# Patient Record
Sex: MALE | Race: WHITE
[De-identification: names, ages, dates, MRNs, and addresses within clinical notes are randomized per-mention and may not be internally consistent; named-entity substitution may affect disease eponyms.]

---

## 2017-12-14 ENCOUNTER — HOSPITAL ENCOUNTER (INPATIENT)
Dept: HOSPITAL 41 - JD.ED | Age: 47
LOS: 2 days | Discharge: HOME | DRG: 812 | End: 2017-12-16
Attending: INTERNAL MEDICINE | Admitting: INTERNAL MEDICINE
Payer: MEDICARE

## 2017-12-14 DIAGNOSIS — Z99.3: ICD-10-CM

## 2017-12-14 DIAGNOSIS — R19.5: ICD-10-CM

## 2017-12-14 DIAGNOSIS — K21.9: ICD-10-CM

## 2017-12-14 DIAGNOSIS — R06.6: ICD-10-CM

## 2017-12-14 DIAGNOSIS — G89.29: ICD-10-CM

## 2017-12-14 DIAGNOSIS — M16.11: ICD-10-CM

## 2017-12-14 DIAGNOSIS — Z88.8: ICD-10-CM

## 2017-12-14 DIAGNOSIS — M21.952: ICD-10-CM

## 2017-12-14 DIAGNOSIS — M21.951: ICD-10-CM

## 2017-12-14 DIAGNOSIS — M25.551: ICD-10-CM

## 2017-12-14 DIAGNOSIS — K22.70: ICD-10-CM

## 2017-12-14 DIAGNOSIS — D50.9: ICD-10-CM

## 2017-12-14 DIAGNOSIS — K44.9: ICD-10-CM

## 2017-12-14 DIAGNOSIS — R03.0: ICD-10-CM

## 2017-12-14 DIAGNOSIS — M25.552: ICD-10-CM

## 2017-12-14 DIAGNOSIS — K20.8: ICD-10-CM

## 2017-12-14 DIAGNOSIS — D64.89: Primary | ICD-10-CM

## 2017-12-14 DIAGNOSIS — G80.9: ICD-10-CM

## 2017-12-14 DIAGNOSIS — K92.2: ICD-10-CM

## 2017-12-14 PROCEDURE — P9016 RBC LEUKOCYTES REDUCED: HCPCS

## 2017-12-14 PROCEDURE — 85025 COMPLETE CBC W/AUTO DIFF WBC: CPT

## 2017-12-14 PROCEDURE — 86850 RBC ANTIBODY SCREEN: CPT

## 2017-12-14 PROCEDURE — 73502 X-RAY EXAM HIP UNI 2-3 VIEWS: CPT

## 2017-12-14 PROCEDURE — 36430 TRANSFUSION BLD/BLD COMPNT: CPT

## 2017-12-14 PROCEDURE — 86922 COMPATIBILITY TEST ANTIGLOB: CPT

## 2017-12-14 PROCEDURE — 80048 BASIC METABOLIC PNL TOTAL CA: CPT

## 2017-12-14 PROCEDURE — 85610 PROTHROMBIN TIME: CPT

## 2017-12-14 PROCEDURE — 99285 EMERGENCY DEPT VISIT HI MDM: CPT

## 2017-12-14 PROCEDURE — 84443 ASSAY THYROID STIM HORMONE: CPT

## 2017-12-14 PROCEDURE — 30233N1 TRANSFUSION OF NONAUTOLOGOUS RED BLOOD CELLS INTO PERIPHERAL VEIN, PERCUTANEOUS APPROACH: ICD-10-PCS | Performed by: INTERNAL MEDICINE

## 2017-12-14 PROCEDURE — 86901 BLOOD TYPING SEROLOGIC RH(D): CPT

## 2017-12-14 PROCEDURE — 80053 COMPREHEN METABOLIC PANEL: CPT

## 2017-12-14 PROCEDURE — 83036 HEMOGLOBIN GLYCOSYLATED A1C: CPT

## 2017-12-14 PROCEDURE — 36415 COLL VENOUS BLD VENIPUNCTURE: CPT

## 2017-12-14 PROCEDURE — 86900 BLOOD TYPING SEROLOGIC ABO: CPT

## 2017-12-14 PROCEDURE — 80061 LIPID PANEL: CPT

## 2017-12-14 NOTE — EDM.PDOC
ED HPI GENERAL MEDICAL PROBLEM





- General


Chief Complaint: Gastrointestinal Problem


Stated Complaint: BLOOD TRANSFUSION


Time Seen by Provider: 12/14/17 18:58


Source of Information: Reports: Patient, Family, Provider


History Limitations: Reports: No Limitations





- History of Present Illness


INITIAL COMMENTS - FREE TEXT/NARRATIVE: 





The patient presents with anemia.  He went to the clinic today to see Lester Garcia for hiccups.  This has been going on for 2 years.  He had never tried to 

get help for this before.  Lester did some lab work and found his Hgb was 5.5.  

The patient does not have any black or tarry stools.  He has no headache, fever

, chills, cough, chest pain, shortness of breath, abdominal pain, nausea or 

vomiting.  He has a history of cerebral palsy and he is in a wheel chair.  He 

does not stand or walk so he is not symptomatic that way with being light 

headed.  He has never had low Hgb before.  He is also having right hip pain.  

They did x-rays on his hip also.


Onset: Gradual


Duration: Day(s):


Severity: Moderate


Improves with: Reports: None


Worsens with: Reports: None


Associated Symptoms: Reports: No Other Symptoms





- Related Data


 Allergies











Allergy/AdvReac Type Severity Reaction Status Date / Time


 


aspirin Allergy  Hives Verified 12/14/17 18:09











Home Meds: 


 Home Meds





. [No Known Home Meds]  12/14/17 [History]











Past Medical History


Gastrointestinal History: Reports: GERD


Neurological History: Reports: Cerebral Palsy


Hematologic History: Reports: Anemia





- Past Surgical History


GI Surgical History: Reports: Appendectomy


Musculoskeletal Surgical History: Reports: Other (See Below)


Other Musculoskeletal Surgeries/Procedures:: Hip surgery





Social & Family History





- Family History


Family Medical History: Noncontributory





- Tobacco Use


Smoking Status *Q: Never Smoker





- Recreational Drug Use


Recreational Drug Use: No





ED ROS GENERAL





- Review of Systems


Review Of Systems: See Below


Constitutional: Reports: No Symptoms


HEENT: Reports: No Symptoms


Respiratory: Reports: No Symptoms


Cardiovascular: Reports: No Symptoms


Endocrine: Reports: No Symptoms


GI/Abdominal: Reports: Other (Hiccups).  Denies: Abdominal Pain, Black Stool, 

Bloody Stool, Hematochezia, Nausea, Vomiting


: Reports: No Symptoms


Musculoskeletal: Reports: Other (Right hip pain)





ED EXAM, GI/ABD





- Physical Exam


Exam: See Below


Exam Limited By: No Limitations


General Appearance: Alert, No Apparent Distress


Ears: Normal External Exam


Nose: Normal Inspection


Head: Atraumatic, Normocephalic


Neck: Normal Inspection


Respiratory/Chest: No Respiratory Distress, Lungs Clear, Normal Breath Sounds


Cardiovascular: Regular Rate, Rhythm, No Edema, No Murmur


GI/Abdominal Exam: Soft, Non-Tender, No Organomegaly, No Mass


Rectal (Males) Exam: Heme + Stool


Back Exam: Normal Inspection


Extremities: Other (Right hip pain)





Course





- Vital Signs


Last Recorded V/S: 





 Last Vital Signs











Temp  99.3 F   12/14/17 21:03


 


Pulse  110 H  12/14/17 18:09


 


Resp  16   12/14/17 21:03


 


BP  137/81   12/14/17 21:03


 


Pulse Ox  98   12/14/17 21:03














- Orders/Labs/Meds


Orders: 





 Active Orders 24 hr











 Category Date Time Status


 


 Peripheral IV Care [RC] .AS DIRECTED Care  12/14/17 19:08 Active


 


 PATIENT RETYPE [BBK] Stat Lab  12/14/17 18:42 Results


 


 RED BLOOD CELLS LP [BBK] Stat Lab  12/14/17 18:42 Results


 


 TYPE AND SCREEN [BBK] Stat Lab  12/14/17 18:42 Results


 


 Sodium Chloride 0.9% [Normal Saline] 1,000 ml Med  12/14/17 23:00 Active





 IV ONETIME   


 


 Sodium Chloride 0.9% [Saline Flush] Med  12/14/17 19:08 Active





 10 ml FLUSH ASDIRECTED PRN   


 


 Peripheral IV Insertion Adult [OM.PC] Routine Oth  12/14/17 19:08 Ordered


 


 Transfuse PRBC [Transfuse Red Blood Cells] [COMM] Stat Oth  12/14/17 19:08 

Ordered








 Medication Orders





Sodium Chloride (Normal Saline)  1,000 mls @ 25 mls/hr IV ONETIME ONE


   Stop: 12/16/17 14:59


Sodium Chloride (Saline Flush)  10 ml FLUSH ASDIRECTED PRN


   PRN Reason: Keep Vein Open


   Last Admin: 12/14/17 20:44  Dose: 10 ml








Labs: 





 Laboratory Tests











  12/14/17 12/14/17 12/14/17 Range/Units





  18:42 18:42 18:42 


 


WBC  8.64    (4.23-9.07)  K/mm3


 


RBC  3.98 L    (4.63-6.08)  M/mm3


 


Hgb  5.4 L*    (13.7-17.5)  gm/L


 


Hct  22.3 L    (40.1-51.0)  %


 


MCV  56.0 L    (79.0-92.2)  fl


 


MCH  13.6 L    (25.7-32.2)  pg


 


MCHC  24.2 L    (32.2-35.5)  g/dl


 


RDW Std Deviation  42.2    (35.1-43.9)  fL


 


Plt Count  735 H    (163-337)  K/mm3


 


MPV  7.8 L    (9.4-12.3)  fl


 


Neut % (Auto)  72.3 H    (34.0-67.9)  %


 


Lymph % (Auto)  18.8 L    (21.8-53.1)  %


 


Mono % (Auto)  5.9    (5.3-12.2)  %


 


Eos % (Auto)  2.3    (0.8-7.0)  


 


Baso % (Auto)  0.6    (0.1-1.2)  %


 


Neut # (Auto)  6.25 H    (1.78-5.38)  K/mm3


 


Lymph # (Auto)  1.62    (1.32-3.57)  K/mm3


 


Mono # (Auto)  0.51    (0.30-0.82)  K/mm3


 


Eos # (Auto)  0.20    (0.04-0.54)  K/mm3


 


Baso # (Auto)  0.05    (0.01-0.08)  K/mm3


 


Manual Slide Review  Abnormal smear    


 


PT   10.8   (8.0-13.0)  SECONDS


 


INR   0.99   


 


Sodium    142  (136-145)  mEq/L


 


Potassium    4.4  (3.5-5.1)  mEq/L


 


Chloride    106  ()  mEq/L


 


Carbon Dioxide    28  (21-32)  mEq/L


 


Anion Gap    12.4  (5-15)  


 


BUN    17  (7-18)  mg/dL


 


Creatinine    0.8  (0.7-1.3)  mg/dL


 


Est Cr Clr Drug Dosing    96.74  mL/min


 


Estimated GFR (MDRD)    > 60  (>60)  mL/min


 


BUN/Creatinine Ratio    21.3 H  (14-18)  


 


Glucose    105  ()  mg/dL


 


Calcium    9.1  (8.5-10.1)  mg/dL


 


Total Bilirubin    0.5  (0.2-1.0)  mg/dL


 


AST    8 L  (15-37)  U/L


 


ALT    15 L  (16-63)  U/L


 


Alkaline Phosphatase    73  ()  U/L


 


Total Protein    7.5  (6.4-8.2)  g/dl


 


Albumin    3.8  (3.4-5.0)  g/dl


 


Globulin    3.7  gm/dL


 


Albumin/Globulin Ratio    1.0  (1-2)  


 


Blood Type     


 


Gel Antibody Screen     


 


Crossmatch     














  12/14/17 Range/Units





  18:42 


 


WBC   (4.23-9.07)  K/mm3


 


RBC   (4.63-6.08)  M/mm3


 


Hgb   (13.7-17.5)  gm/L


 


Hct   (40.1-51.0)  %


 


MCV   (79.0-92.2)  fl


 


MCH   (25.7-32.2)  pg


 


MCHC   (32.2-35.5)  g/dl


 


RDW Std Deviation   (35.1-43.9)  fL


 


Plt Count   (163-337)  K/mm3


 


MPV   (9.4-12.3)  fl


 


Neut % (Auto)   (34.0-67.9)  %


 


Lymph % (Auto)   (21.8-53.1)  %


 


Mono % (Auto)   (5.3-12.2)  %


 


Eos % (Auto)   (0.8-7.0)  


 


Baso % (Auto)   (0.1-1.2)  %


 


Neut # (Auto)   (1.78-5.38)  K/mm3


 


Lymph # (Auto)   (1.32-3.57)  K/mm3


 


Mono # (Auto)   (0.30-0.82)  K/mm3


 


Eos # (Auto)   (0.04-0.54)  K/mm3


 


Baso # (Auto)   (0.01-0.08)  K/mm3


 


Manual Slide Review   


 


PT   (8.0-13.0)  SECONDS


 


INR   


 


Sodium   (136-145)  mEq/L


 


Potassium   (3.5-5.1)  mEq/L


 


Chloride   ()  mEq/L


 


Carbon Dioxide   (21-32)  mEq/L


 


Anion Gap   (5-15)  


 


BUN   (7-18)  mg/dL


 


Creatinine   (0.7-1.3)  mg/dL


 


Est Cr Clr Drug Dosing   mL/min


 


Estimated GFR (MDRD)   (>60)  mL/min


 


BUN/Creatinine Ratio   (14-18)  


 


Glucose   ()  mg/dL


 


Calcium   (8.5-10.1)  mg/dL


 


Total Bilirubin   (0.2-1.0)  mg/dL


 


AST   (15-37)  U/L


 


ALT   (16-63)  U/L


 


Alkaline Phosphatase   ()  U/L


 


Total Protein   (6.4-8.2)  g/dl


 


Albumin   (3.4-5.0)  g/dl


 


Globulin   gm/dL


 


Albumin/Globulin Ratio   (1-2)  


 


Blood Type  A NEGATIVE  


 


Gel Antibody Screen  Negative  


 


Crossmatch  See Detail  











Meds: 





Medications











Generic Name Dose Route Start Last Admin





  Trade Name Freq  PRN Reason Stop Dose Admin


 


Sodium Chloride  1,000 mls @ 25 mls/hr  12/14/17 23:00  





  Normal Saline  IV  12/16/17 14:59  





  ONETIME ONE   


 


Sodium Chloride  10 ml  12/14/17 19:08  12/14/17 20:44





  Saline Flush  FLUSH   10 ml





  ASDIRECTED PRN   Administration





  Keep Vein Open   














Discontinued Medications














Generic Name Dose Route Start Last Admin





  Trade Name Freq  PRN Reason Stop Dose Admin


 


Sodium Chloride  Confirm  12/14/17 20:43  12/14/17 21:02





  Normal Saline  Administered  12/14/17 20:44  Not Given





  Dose   





  1,000 mls @ as directed   





  .ROUTE   





  .STK-MED ONE   














- Re-Assessments/Exams


Free Text/Narrative Re-Assessment/Exam: 





12/14/17 21:14


I ordered an IV saline lock and labs.


12/14/17 21:15


His Hgb was 5.4.  The rest of his labs look good.  His stool guiac was 

positive.  I have ordered 2 units.  I feel he needs to be admitted.  I called 

Dr Schneider and she agreed to the admission.





Departure





- Departure


Time of Disposition: 21:20


Disposition: Admitted As Inpatient 64


Clinical Impression: 


 Right hip pain, Hiccups





Anemia


Qualifiers:


 Anemia type: other cause Other causes of anemia: other cause, not classified 

Qualified Code(s): D64.89 - Other specified anemias





GI bleed


Qualifiers:


 GI bleed type/associated pathology: unspecified gastrointestinal hemorrhage 

type Qualified Code(s): K92.2 - Gastrointestinal hemorrhage, unspecified





Cerebral palsy


Qualifiers:


 Cerebral palsy type: unspecified type Qualified Code(s): G80.9 - Cerebral palsy

, unspecified








- Discharge Information


Referrals: 


Lester Garcia PA-C [Primary Care Provider] - 





- My Orders


Last 24 Hours: 





My Active Orders





12/14/17 18:42


RED BLOOD CELLS LP [BBK] Stat 





12/14/17 19:08


Peripheral IV Care [RC] .AS DIRECTED 


Sodium Chloride 0.9% [Saline Flush]   10 ml FLUSH ASDIRECTED PRN 


Peripheral IV Insertion Adult [OM.PC] Routine 


Transfuse PRBC [Transfuse Red Blood Cells] [COMM] Stat 





12/14/17 23:00


Sodium Chloride 0.9% [Normal Saline] 1,000 ml IV ONETIME 














- Assessment/Plan


Last 24 Hours: 





My Active Orders





12/14/17 18:42


RED BLOOD CELLS LP [BBK] Stat 





12/14/17 19:08


Peripheral IV Care [RC] .AS DIRECTED 


Sodium Chloride 0.9% [Saline Flush]   10 ml FLUSH ASDIRECTED PRN 


Peripheral IV Insertion Adult [OM.PC] Routine 


Transfuse PRBC [Transfuse Red Blood Cells] [COMM] Stat 





12/14/17 23:00


Sodium Chloride 0.9% [Normal Saline] 1,000 ml IV ONETIME

## 2017-12-14 NOTE — PCM.HP
H&P History of Present Illness





- General


Date of Service: 12/14/17


Admit Problem/Dx: 


Anemia 





Source of Information: Patient, Old Records, Provider, RN, RN Notes Reviewed


History Limitations: Reports: No Limitations





- History of Present Illness


Initial Comments - Free Text/Narative: 


Tab Carpenter is a 46 yo male who presented to ED today after being seen in 

clinic by Lester Garcia PA-C hiccups, which have been ongoing for the past 2 

years. This was his first time seeing anyone for the hiccups. Lester did some lab 

work and his hemoglobin was found to be 5.5. e denies any black, tarry or 

stools containing blood.  Denies headaches, fever, chills, cough, chest pain, 

shortness of breath, abdominal pain, nausea  History of cerebral palsy and is 

wheelchair bound.  He does not stand or walk.  Because of this he does not get 

lightheaded.  He has never had low hemoglobin before to the best of his 

knowledge. He is also having right hip pain.





In the ED temperature was 99.3.  Pulse was 110. /81.  pulse ox 90% on 

room air. labs were obtained: WBCs are normal at 8.64.  RBCs are low at 3.9.  

Hemoglobin  He is very microcytic.  platelets are elevated at 735,000. 

Neutrophils are elevated at 72.3%. PT is 10.8.  INR 0.99.  Sodium good at 142.  

Potassium 4.4.  Chloride 106. carbon dioxide 28.  Anion gap 12.4.  BUN 17.  

Creatinine 0.8.  eGFR is greater than 60. glucose is 105.  Calcium 9.1.  Total 

bilirubin 0.5. AST is low at 8.  ALT low at 15.  Alkaline phosphatase is 73.  

Protein is good at 7.5. Albumin is 3.8.  Type and screen was performed with.  

General antibody screen was negati  Stool guaiac was obtained and was positive.

  Normal saline was established at 25 ml/hr. A blood transfusion was initiated.

  3 units total to be infused. Hip x-rays were obtained. Right hip shows severe 

degenerative changes. Formal radiologist read is pending. 





He carries a history of GERD and cerebral palsy. He was never a smoker.





He is subsequently admitted to the medical floor. He is a full code. His PCP is 

Lester Garcia PA-C, here at [facility]. 








- Related Data


Allergies/Adverse Reactions: 


 Allergies











Allergy/AdvReac Type Severity Reaction Status Date / Time


 


aspirin Allergy  Hives Verified 12/14/17 18:09











Home Medications: 


 Home Meds





. [No Known Home Meds]  12/14/17 [History]











Past Medical History


Gastrointestinal History: Reports: GERD


Neurological History: Reports: Cerebral Palsy


Hematologic History: Reports: Anemia





- Past Surgical History


GI Surgical History: Reports: Appendectomy


Musculoskeletal Surgical History: Reports: Other (See Below)


Other Musculoskeletal Surgeries/Procedures:: Hip surgery





Social & Family History





- Family History


Family Medical History: Noncontributory





- Tobacco Use


Smoking Status *Q: Never Smoker





- Recreational Drug Use


Recreational Drug Use: No





H&P Review of Systems





- Review of Systems:


Review Of Systems: See Below


General: Reports: No Symptoms.  Denies: Fever, Chills, Malaise, Weakness, 

Fatigue, Night Sweats, Diaphoresis


HEENT: Reports: Other (Hiccups over past 2 years - currently experiencing. ).  

Denies: Ear Pain, Eye Pain, Headaches, Sore Throat, Visual Changes


Pulmonary: Reports: No Symptoms.  Denies: Shortness of Breath, Wheezing, 

Pleuritic Chest Pain, Cough, Sputum


Cardiovascular: Reports: No Symptoms, Edema (left lower leg chronically 

edematous).  Denies: Chest Pain, Palpitations, Dyspnea on Exertion, Orthopnea


Gastrointestinal: Reports: No Symptoms, Other (Denies any changes in stool 

habbits ).  Denies: Abdominal Pain, Anorexia, Black Stool, Bloody Stool, 

Constipation, Diarrhea, Difficulty Swallowing, Distension, Melena, Nausea, 

Vomiting


Genitourinary: Reports: No Symptoms.  Denies: Dysuria, Frequency, Burning, Pain

, Urgency, Incontinence


Musculoskeletal: Reports: No Symptoms, Joint Pain (Right hip pain - comes an 

goes ).  Denies: Neck Pain, Shoulder Pain, Arm Pain, Back Pain, Hand Pain, 

Muscle Pain, Muscle Stiffness


Skin: Reports: No Symptoms


Psychiatric: Reports: No Symptoms


Neurological: Reports: No Symptoms


Hematologic/Lymphatic: Reports: No Symptoms.  Denies: Easy Bleeding, Easy 

Bruising


Immunologic: Reports: No Symptoms





Exam





- Exam


Exam: See Below





- Vital Signs


Vital Signs: 


 Last Vital Signs











Temp  99.3 F   12/14/17 21:03


 


Pulse  110 H  12/14/17 18:09


 


Resp  16   12/14/17 21:03


 


BP  137/81   12/14/17 21:03


 


Pulse Ox  98   12/14/17 21:03











Weight: 135 lb





- Exam


Quality Assessment: DVT Prophylaxis


General: Alert, Oriented, Cooperative.  No: Mild Distress


HEENT: Conjunctiva Clear, EACs Clear, EOMI (although some jerky movements due 

to CP - chronic), Hearing Intact, Mucosa Moist & Pink, Nares Patent, Normal 

Nasal Septum, Posterior Pharynx Clear, TMs Clear


Neck: Supple, Trachea Midline.  No: JVD, Thyromegaly


Lungs: Clear to Auscultation, Normal Respiratory Effort


Cardiovascular: Regular Rate, Regular Rhythm


GI/Abdominal Exam: Normal Bowel Sounds, Soft, Non-Tender, No Organomegaly, No 

Distention, No Abnormal Bruit, No Mass, Pelvis Stable


 (Male) Exam: Deferred


Rectal (Males) Exam: Deferred


Back Exam: Full Range of Motion, Decreased Range of Motion.  No: CVA Tenderness 

(L), CVA Tenderness (R)


Extremities: Non-Tender, Normal Capillary Refill, Pedal Edema (Left leg - 

chronic ), Other (contractures to legs )


Peripheral Pulses: 3+: Radial (L), Radial (R), Posterior Tibial (L), Posterior 

Tibial (R), Dorsalis Pedis (L), Dorsalis Pedis (R)


Skin: Warm, Dry, Intact


Neurological: Cranial Nerves Intact (grossly)


Neuro Extensive - Mental Status: Alert, Oriented x3, Normal Mood/Affect, Normal 

Cognition, Memory Intact


Neuro Extensive - Motor, Sensory, Reflexes: CN II-XII Intact (rossly)


Psychiatric: Alert, Normal Affect, Normal Mood





- Patient Data


Result Diagrams: 


 12/14/17 18:42





 12/14/17 18:42





*Q Meaningful Use (ADM)





- VTE *Q


VTE Criteria *Q: 








- Stroke *Q


Stroke Criteria *Q: 








- AMI *Q


AMI Criteria *Q: 








- Problem List


(1) Anemia


SNOMED Code(s): 738727265


   ICD Code: D64.9 - ANEMIA, UNSPECIFIED   Status: Acute   Priority: High   

Current Visit: Yes   


Qualifiers: 


   Anemia type: other cause   Other causes of anemia: other cause, not 

classified   Qualified Code(s): D64.89 - Other specified anemias   





(2) Cerebral palsy


SNOMED Code(s): 504807378


   ICD Code: G80.9 - CEREBRAL PALSY, UNSPECIFIED   Status: Chronic   Priority: 

Medium   Current Visit: Yes   


Qualifiers: 


   Cerebral palsy type: spastic diplegic   Qualified Code(s): G80.1 - Spastic 

diplegic cerebral palsy   





(3) GI bleed


SNOMED Code(s): 22464162


   ICD Code: K92.2 - GASTROINTESTINAL HEMORRHAGE, UNSPECIFIED   Status: Acute   

Priority: High   Current Visit: Yes   


Qualifiers: 


   GI bleed type/associated pathology: unspecified gastrointestinal hemorrhage 

type   Qualified Code(s): K92.2 - Gastrointestinal hemorrhage, unspecified   





(4) Hiccups


SNOMED Code(s): 50744245


   ICD Code: R06.6 - HICCOUGH   Status: Chronic   Priority: Low   Current Visit

: Yes   





(5) Right hip pain


SNOMED Code(s): 51829726


   ICD Code: M25.551 - PAIN IN RIGHT HIP   Status: Acute   Priority: Low   

Current Visit: Yes   


Problem List Initiated/Reviewed/Updated: Yes


Orders Last 24hrs: 


 Active Orders 24 hr











 Category Date Time Status


 


 Verify Patient Consent Obtain [RC] ASDIRECTED Care  12/14/17 21:40 Active


 


 Transfuse Red Blood Cells [COMM] Routine Oth  12/14/17 21:39 Ordered








 Medication Orders





Sodium Chloride (Normal Saline)  1,000 mls @ 25 mls/hr IV ONETIME ONE


   Stop: 12/16/17 14:59


Sodium Chloride (Saline Flush)  10 ml FLUSH ASDIRECTED PRN


   PRN Reason: Keep Vein Open


   Last Admin: 12/14/17 20:44  Dose: 10 ml








Assessment/Plan Comment:: 


I/P:





Acute





Anemia


   -Hgb 5.4


   -Hct 22.3


   -PT 10.8, INR 0.99


   -Denies changes in bowel habits. Denies melonic stools or hematochezia.


   -Denies any weakness, lightheadedness or fatigue


   -Is wheelchair bound canonically with CP and is unable to stand 


   -Stool guiac positive


   -Blood type and screen done in ED


   -Will transfuse 3 units tonight - started in ED


   -Monitor for response


   -General surgery consult - Dr. Evans 





Chronic:


Right hip pain


   -X-rays obtained show degenerative changes


   -F/U outpatient with PCP/ortho


Hiccups - F/u outpatient with PCP


GERD - stable


Cerebral palsy - wheelchair bound 





Plan:


Admit to medical floor with telemetry


CM for discharge planning/Home needs


PT/OT


GI prophylaxis - PPI


DVT/PE prophylaxis - LOYD Hose - will hold lovenox for now due to active GI bleed


Other orders as indicated above


Routine AM labs





Code status: Full Code. His PCP is Lester Garcia PA-C here at [facility], although he 

recently moved to the area and has not been to a medical provider in some time.

## 2017-12-15 PROCEDURE — 0DB38ZX EXCISION OF LOWER ESOPHAGUS, VIA NATURAL OR ARTIFICIAL OPENING ENDOSCOPIC, DIAGNOSTIC: ICD-10-PCS | Performed by: SURGERY

## 2017-12-15 PROCEDURE — 0DB18ZX EXCISION OF UPPER ESOPHAGUS, VIA NATURAL OR ARTIFICIAL OPENING ENDOSCOPIC, DIAGNOSTIC: ICD-10-PCS | Performed by: SURGERY

## 2017-12-15 PROCEDURE — 0DJD8ZZ INSPECTION OF LOWER INTESTINAL TRACT, VIA NATURAL OR ARTIFICIAL OPENING ENDOSCOPIC: ICD-10-PCS | Performed by: SURGERY

## 2017-12-15 NOTE — PCM.PREANE
Preanesthetic Assessment





- Procedure


Proposed Procedure: 





Diagnostic colonoscopy/ EGD





- Anesthesia/Transfusion/Family Hx


Anesthesia History: Prior Anesthesia Without Reaction (about 10 years ago per 

patient)


Family History of Anesthesia Reaction: No


Transfusion History: No Prior Transfusion(s)


Intubation History: Unknown





- Review of Systems


General: No Symptoms


Pulmonary: No Symptoms


Cardiovascular: No Symptoms


Gastrointestinal: No Symptoms


Neurological: Other (Cerebral pasly- wheelchair bound )


Other: Reports: None





- Physical Assessment


NPO Status Date: 12/15/17


NPO Status Time: 11:30 (Sipping on golytely as we speak)


Pulse: 93


O2 Sat by Pulse Oximetry: 99


Respiratory Rate: 16


Blood Pressure: 117/70


Temperature: 36.8 C


Vital Signs: 





 Last Vital Signs











Temp  37.0 C   12/15/17 09:20


 


Pulse  93   12/15/17 09:20


 


Resp  16   12/15/17 09:20


 


BP  129/77   12/15/17 09:20


 


Pulse Ox  99   12/15/17 09:20











Height: 1.65 m


Weight: 51.075 kg


ASA Class: 2


Mental Status: Alert & Oriented x3


Airway Class: Mallampati = 1


Dentition: Reports: Normal Dentition


Thyro-Mental Finger Breadths: 3


Mouth Opening Finger Breadths: 3


ROM/Head Extension: Full


Lungs: Clear to Auscultation, Normal Respiratory Effort


Cardiovascular: Regular Rate, Regular Rhythm





- Lab


Values: 





 Laboratory Last Values











WBC  8.87 K/mm3 (4.23-9.07)   12/15/17  06:00    


 


RBC  4.90 M/mm3 (4.63-6.08)   12/15/17  06:00    


 


Hgb  9.2 gm/L (13.7-17.5)  L  12/15/17  06:00    


 


Hct  31.9 % (40.1-51.0)  L  12/15/17  06:00    


 


MCV  65.1 fl (79.0-92.2)  L  12/15/17  06:00    


 


MCH  18.8 pg (25.7-32.2)  L  12/15/17  06:00    


 


MCHC  28.8 g/dl (32.2-35.5)  L  12/15/17  06:00    


 


RDW Std Deviation  67.3 fL (35.1-43.9)  H  12/15/17  06:00    


 


Plt Count  578 K/mm3 (163-337)  H  12/15/17  06:00    


 


MPV  8.5 fl (9.4-12.3)  L  12/15/17  06:00    


 


Neut % (Auto)  70.6 % (34.0-67.9)  H  12/15/17  06:00    


 


Lymph % (Auto)  18.8 % (21.8-53.1)  L  12/15/17  06:00    


 


Mono % (Auto)  6.9 % (5.3-12.2)   12/15/17  06:00    


 


Eos % (Auto)  2.9  (0.8-7.0)   12/15/17  06:00    


 


Baso % (Auto)  0.7 % (0.1-1.2)   12/15/17  06:00    


 


Neut # (Auto)  6.26 K/mm3 (1.78-5.38)  H  12/15/17  06:00    


 


Lymph # (Auto)  1.67 K/mm3 (1.32-3.57)   12/15/17  06:00    


 


Mono # (Auto)  0.61 K/mm3 (0.30-0.82)   12/15/17  06:00    


 


Eos # (Auto)  0.26 K/mm3 (0.04-0.54)   12/15/17  06:00    


 


Baso # (Auto)  0.06 K/mm3 (0.01-0.08)   12/15/17  06:00    


 


Manual Slide Review  Abnormal smear   12/15/17  06:00    


 


PT  10.8 SECONDS (8.0-13.0)   12/14/17  18:42    


 


INR  0.99   12/14/17  18:42    


 


Sodium  141 mEq/L (136-145)   12/15/17  06:00    


 


Potassium  4.1 mEq/L (3.5-5.1)   12/15/17  06:00    


 


Chloride  105 mEq/L ()   12/15/17  06:00    


 


Carbon Dioxide  27 mEq/L (21-32)   12/15/17  06:00    


 


Anion Gap  13.1  (5-15)   12/15/17  06:00    


 


BUN  15 mg/dL (7-18)   12/15/17  06:00    


 


Creatinine  0.8 mg/dL (0.7-1.3)   12/15/17  06:00    


 


Est Cr Clr Drug Dosing  80.69 mL/min  12/15/17  06:00    


 


Estimated GFR (MDRD)  > 60 mL/min (>60)   12/15/17  06:00    


 


BUN/Creatinine Ratio  18.8  (14-18)  H  12/15/17  06:00    


 


Glucose  111 mg/dL ()  H  12/15/17  06:00    


 


Calcium  8.5 mg/dL (8.5-10.1)   12/15/17  06:00    


 


Magnesium  2.0 mg/dl (1.8-2.4)   12/15/17  06:00    


 


Total Bilirubin  0.5 mg/dL (0.2-1.0)   12/14/17  18:42    


 


AST  8 U/L (15-37)  L  12/14/17  18:42    


 


ALT  15 U/L (16-63)  L  12/14/17  18:42    


 


Alkaline Phosphatase  73 U/L ()   12/14/17  18:42    


 


NT-Pro-B Natriuret Pep  118 pg/mL (0-125)   12/15/17  06:00    


 


Total Protein  7.5 g/dl (6.4-8.2)   12/14/17  18:42    


 


Albumin  3.8 g/dl (3.4-5.0)   12/14/17  18:42    


 


Globulin  3.7 gm/dL  12/14/17  18:42    


 


Albumin/Globulin Ratio  1.0  (1-2)   12/14/17  18:42    


 


Blood Type  A NEGATIVE   12/14/17  18:42    


 


Gel Antibody Screen  Negative   12/14/17  18:42    


 


Crossmatch  See Detail   12/14/17  18:42    














- Allergies


Allergies/Adverse Reactions: 


 Allergies











Allergy/AdvReac Type Severity Reaction Status Date / Time


 


aspirin Allergy  Hives Verified 12/14/17 18:09














- Blood


Blood Available: No


Product(s) Available: None





- Anesthesia Plan


Pre-Op Medication Ordered: None





- Acknowledgements


Anesthesia Type Planned: MAC (will have to wait at least 2+ hours after clear 

liquid bowel prep to proceed with the procedures)


Pt an Appropriate Candidate for the Planned Anesthesia: Yes


Alternatives and Risks of Anesthesia Discussed w Pt/Guardian: Yes


Pt/Guardian Understands and Agrees with Anesthesia Plan: Yes





PreAnesthesia Questionnaire


Cardiovascular History: Reports: None


Respiratory History: Reports: None


Gastrointestinal History: Reports: GERD


Musculoskeletal History: Reports: None


Neurological History: Reports: Cerebral Palsy


Endocrine/Metabolic History: Reports: None


Hematologic History: Reports: Anemia


Dermatologic History: Reports: None





- Infectious Disease History


Infectious Disease History: Reports: Chicken Pox





- Past Surgical History


GI Surgical History: Reports: Appendectomy


Endocrine Surgical History: Reports: None


Neurological Surgical History: Reports: None


Musculoskeletal Surgical History: Reports: Other (See Below)


Other Musculoskeletal Surgeries/Procedures:: Hip surgery Left side





- SUBSTANCE USE


Smoking Status *Q: Never Smoker


Second Hand Smoke Exposure: No


Days Per Week of Alcohol Use: 1


Number of Drinks Per Day: 1


Total Drinks Per Week: 1


Recreational Drug Use History: No





- HOME MEDS


Home Medications: 


 Home Meds





. [No Known Home Meds]  12/14/17 [History]











- CURRENT (IN HOUSE) MEDS


Current Meds: 





 Current Medications





Acetaminophen (Tylenol)  650 mg PO Q4H PRN


   PRN Reason: Pain (Mild 1-3)/fever


Al Hydroxide/Mg Hydroxide (Mag-Al Plus)  30 ml PO Q4H PRN


   PRN Reason: heartburn


   Last Admin: 12/15/17 00:36 Dose:  30 ml


Ondansetron HCl (Zofran Odt)  4 mg PO Q6H PRN


   PRN Reason: nausea, able to take PO


Ondansetron HCl (Zofran)  4 mg IV Q6H PRN


   PRN Reason: Nausea/Vomiting


Oxycodone/Acetaminophen (Percocet 325-5 Mg)  1 tab PO Q4H PRN


   PRN Reason: Pain (moderate 4-6)


Pantoprazole Sodium (Protonix***)  40 mg PO BIDAC TERRELL


   Last Admin: 12/15/17 05:56 Dose:  40 mg


Sodium Chloride (Saline Flush)  10 ml FLUSH ASDIRECTED PRN


   PRN Reason: Keep Vein Open


   Last Admin: 12/14/17 20:44 Dose:  10 ml


Temazepam (Restoril)  15 mg PO BEDTIME PRN


   PRN Reason: Sleep





Discontinued Medications





Enoxaparin Sodium (Lovenox)  30 mg SUBCUT DAILY [facility]


Sodium Chloride (Normal Saline) Confirm Administered Dose 1,000 mls @ as 

directed .ROUTE .STK-MED ONE


   Stop: 12/14/17 20:44


   Last Admin: 12/14/17 21:02 Dose:  Not Given


Sodium Chloride (Normal Saline)  1,000 mls @ 25 mls/hr IV ONETIME ONE


   Stop: 12/16/17 14:59


   Last Admin: 12/14/17 23:15 Dose:  25 mls/hr


Lidocaine HCl (Xylocaine-Mpf 1%) Confirm Administered Dose 4 mls @ as directed 

.ROUTE .STK-MED ONE


   Stop: 12/15/17 10:22


Polyethylene Glycol/Electrolytes (Golytely)  4,000 ml PO ONETIME ONE


   Stop: 12/15/17 08:51


   Last Admin: 12/15/17 09:19 Dose:  4,000 ml


Propofol (Diprivan  20 Ml) Confirm Administered Dose 400 mg .ROUTE .STK-MED ONE


   Stop: 12/15/17 10:23

## 2017-12-15 NOTE — PCM.PN
- General Info


Date of Service: 12/15/17


Functional Status: Reports: Tolerating Diet, Urinating





- Review of Systems


General: Reports: No Symptoms


HEENT: Reports: No Symptoms


Pulmonary: Reports: No Symptoms


Cardiovascular: Reports: No Symptoms


Gastrointestinal: Reports: No Symptoms


Genitourinary: Reports: No Symptoms


Musculoskeletal: Reports: No Symptoms


Skin: Reports: No Symptoms


Neurological: Reports: No Symptoms


Psychiatric: Reports: No Symptoms





- Patient Data


Vitals - Most Recent: 


 Last Vital Signs











Temp  37.0 C   12/15/17 09:20


 


Pulse  93   12/15/17 09:20


 


Resp  16   12/15/17 09:20


 


BP  129/77   12/15/17 09:20


 


Pulse Ox  99   12/15/17 09:20











Weight - Most Recent: 51.075 kg


I&O - Last 24 Hours: 


 Intake & Output











 12/14/17 12/15/17 12/15/17





 22:59 06:59 14:59


 


Intake Total 0 1450 


 


Output Total  200 


 


Balance 0 1250 











Lab Results Last 24 Hours: 


 Laboratory Results - last 24 hr











  12/15/17 12/15/17 Range/Units





  06:00 06:00 


 


WBC  8.87   (4.23-9.07)  K/mm3


 


RBC  4.90   (4.63-6.08)  M/mm3


 


Hgb  9.2 L   (13.7-17.5)  gm/L


 


Hct  31.9 L   (40.1-51.0)  %


 


MCV  65.1 L   (79.0-92.2)  fl


 


MCH  18.8 L   (25.7-32.2)  pg


 


MCHC  28.8 L   (32.2-35.5)  g/dl


 


RDW Std Deviation  67.3 H   (35.1-43.9)  fL


 


Plt Count  578 H   (163-337)  K/mm3


 


MPV  8.5 L   (9.4-12.3)  fl


 


Neut % (Auto)  70.6 H   (34.0-67.9)  %


 


Lymph % (Auto)  18.8 L   (21.8-53.1)  %


 


Mono % (Auto)  6.9   (5.3-12.2)  %


 


Eos % (Auto)  2.9   (0.8-7.0)  


 


Baso % (Auto)  0.7   (0.1-1.2)  %


 


Neut # (Auto)  6.26 H   (1.78-5.38)  K/mm3


 


Lymph # (Auto)  1.67   (1.32-3.57)  K/mm3


 


Mono # (Auto)  0.61   (0.30-0.82)  K/mm3


 


Eos # (Auto)  0.26   (0.04-0.54)  K/mm3


 


Baso # (Auto)  0.06   (0.01-0.08)  K/mm3


 


Manual Slide Review  Abnormal smear   


 


Sodium   141  (136-145)  mEq/L


 


Potassium   4.1  (3.5-5.1)  mEq/L


 


Chloride   105  ()  mEq/L


 


Carbon Dioxide   27  (21-32)  mEq/L


 


Anion Gap   13.1  (5-15)  


 


BUN   15  (7-18)  mg/dL


 


Creatinine   0.8  (0.7-1.3)  mg/dL


 


Est Cr Clr Drug Dosing   80.69  mL/min


 


Estimated GFR (MDRD)   > 60  (>60)  mL/min


 


BUN/Creatinine Ratio   18.8 H  (14-18)  


 


Glucose   111 H  ()  mg/dL


 


Calcium   8.5  (8.5-10.1)  mg/dL


 


Magnesium   2.0  (1.8-2.4)  mg/dl


 


NT-Pro-B Natriuret Pep   118  (0-125)  pg/mL











Med Orders - Current: 


 Current Medications





Acetaminophen (Tylenol)  650 mg PO Q4H PRN


   PRN Reason: Pain (Mild 1-3)/fever


Al Hydroxide/Mg Hydroxide (Mag-Al Plus)  30 ml PO Q4H PRN


   PRN Reason: heartburn


   Last Admin: 12/15/17 00:36 Dose:  30 ml


Ondansetron HCl (Zofran Odt)  4 mg PO Q6H PRN


   PRN Reason: nausea, able to take PO


Ondansetron HCl (Zofran)  4 mg IV Q6H PRN


   PRN Reason: Nausea/Vomiting


Oxycodone/Acetaminophen (Percocet 325-5 Mg)  1 tab PO Q4H PRN


   PRN Reason: Pain (moderate 4-6)


Pantoprazole Sodium (Protonix***)  40 mg PO BIDAC TERRELL


   Last Admin: 12/15/17 05:56 Dose:  40 mg


Sodium Chloride (Saline Flush)  10 ml FLUSH ASDIRECTED PRN


   PRN Reason: Keep Vein Open


   Last Admin: 12/14/17 20:44 Dose:  10 ml


Temazepam (Restoril)  15 mg PO BEDTIME PRN


   PRN Reason: Sleep





Discontinued Medications





Enoxaparin Sodium (Lovenox)  30 mg SUBCUT DAILY TER[facility]


Sodium Chloride (Normal Saline) Confirm Administered Dose 1,000 mls @ as 

directed .ROUTE .STK-MED ONE


   Stop: 12/14/17 20:44


   Last Admin: 12/14/17 21:02 Dose:  Not Given


Sodium Chloride (Normal Saline)  1,000 mls @ 25 mls/hr IV ONETIME ONE


   Stop: 12/16/17 14:59


   Last Admin: 12/14/17 23:15 Dose:  25 mls/hr


Polyethylene Glycol/Electrolytes (Golytely)  4,000 ml PO ONETIME ONE


   Stop: 12/15/17 08:51


   Last Admin: 12/15/17 09:19 Dose:  4,000 ml











- Exam


Quality Assessment: DVT Prophylaxis


General: Alert, Oriented, Cooperative, No Acute Distress


HEENT: Pupils Equal, Pupils Reactive, EOMI


Neck: Trachea Midline, No JVD


Lungs: Normal Respiratory Effort


Cardiovascular: Regular Rate, Regular Rhythm


GI/Abdominal Exam: Normal Bowel Sounds, Soft, Non-Tender, No Organomegaly, No 

Distention


 (Male) Exam: Deferred


Back Exam: Normal Inspection


Extremities: Normal Inspection


Skin: Warm


Neurological: No New Focal Deficit, Normal Speech


Psy/Mental Status: Alert, Normal Affect, Normal Mood





- Problem List Review


Problem List Initiated/Reviewed/Updated: Yes





- My Orders


Last 24 Hours: 


My Active Orders





12/15/17 00:21


Alum Hydrox/Mag Hydrox/Simeth [Mag-Al Plus]   30 ml PO Q4H PRN 





12/15/17 08:29


Antiembolic Devices [RC] PER UNIT ROUTINE 


LOYD Hose [Antiembolic Hose] [OM.PC] Routine 














- Plan


Plan:: 


I/P:





Acute





Anemia


   -Hgb 5.4-->stable post transfusion


   -Hct 22.3


   -PT 10.8, INR 0.99


   -Denies changes in bowel habits. Denies melonic stools or hematochezia.


   -Denies any weakness, lightheadedness or fatigue


   -Is wheelchair bound canonically with CP and is unable to stand 


   -Stool guiac positive


   -Blood type and screen done in ED


   -Will transfuse 3 units tonight - started in ED


   -Monitor for response


   -General surgery consult - Dr. Evans S/P EGD, no acute bleeding; path for 

Villalpando's has been submitted.


DC in the AM.





Chronic:


Right hip pain


   -X-rays obtained show degenerative changes


   -F/U outpatient with PCP/ortho


Hiccups - F/u outpatient with PCP


GERD - stable


Cerebral palsy - wheelchair bound 





Plan:


Admit to medical floor with telemetry


CM for discharge planning/Home needs


PT/OT


GI prophylaxis - PPI


DVT/PE prophylaxis - LOYD Hose - will hold lovenox for now due to active GI bleed


Other orders as indicated above


Routine AM labs





Code status: Full Code. His PCP is Lester Garcia PA-C here at [facility], although he 

recently moved to the area and has not been to a medical provider in some time.

## 2017-12-15 NOTE — PCM.OPNOTE
- General Post-Op/Procedure Note


Date of Surgery/Procedure: 12/15/17


Operative Procedure(s): 1. Diagnostic Esophagogastroduodenoscopy with distal 

and proximal esophageal biopsies.  2. Diagnostic Colonoscopy


Findings: 





1. Moderately large sliding type I hiatal hernia with long segment confluent 

endoscopic Villalpando's change, and associated mild esophagitis


2. Normal colonoscopy


Pre Op Diagnosis: Anemia of unknown etiology with heme-positive stools


Post-Op Diagnosis: 1. Endoscopic Villalpando's change; long segment.  2. Mild type 

I distal esophagitis.  3. moderately large type 1 sliding hiatal hernia


Anesthesia Technique: MAC, Moderate Sedation


Primary Surgeon: David Evans


Pathology: 





Esophageal biopsies 1 at 25 cm and the other at the herniated GE junction


EBL in mLs: 0


Complications: None


Condition: Good


Free Text/Narrative:: 


 Intake & Output











 12/15/17 12/15/17 12/15/17





 06:59 14:59 22:59


 


Intake Total 1450  


 


Output Total 200  


 


Balance 1250  








After adequate IV sedation and analgesia was obtained with monitoring the 

patient was placed on his left side. Through a bite block a lubricated upper 

endoscope was inserted into the esophagus then advanced towards the stomach 

with air insufflation as necessary. Additional air was given in the stomach. 

The antrum was identified followed by passage of the scope into the second part 

of the duodenum. The second and first parts of the duodenum were endoscopically 

normal with no mass lesions, erosive, or ulcerative changes. The antrum was 

unremarkable. In the retroflexed view there was a moderate-sized type I hiatal 

hernia. The fundic and cardiac regions were endoscopically normal. There were 

no varices or Autumn-Husain tears. The body of the stomach had normal rugal 

folds and contained no ulcers. There were no mass lesions within the body of 

the stomach. The scope was then withdrawn to the herniated segment of the 

stomach. This pouch contained no ulcers and no stasis changes. The GE junction 

had mild erosive esophagitis which was biopsied. There was a long segment 

endoscopic Villalpando's change of approximately 8 cm in length. This area was 

confluent and contained no nodules, mass lesions, or ulcers. A biopsy at 25 cm 

was performed 2 for histologic evaluation. The proximal esophagus was 

unremarkable. The vocal cords were briefly visualized on extubation and were 

grossly normal. Air was removed as I finished this part of the procedure which 

he tolerated well. Representative photographs were taken for the patient and 

for the medical record.





Perianal inspection and digital rectal examination were performed next and were 

unremarkable. The sphincter tone was normal. The prostate was normal. A 

lubricated colonoscope was then inserted into the rectum then advanced under 

direct vision easily towards the cecum without difficulty. The bowel 

preparation was excellent. The cecum, ascending colon, and transverse colons 

were endoscopically normal. There were no mass lesions or inflammatory changes 

seen. There were no AV malformations. There was no luminal blood present. The 

descending and sigmoid colons likewise were endoscopically normal. The rectum 

in both views contained no mass lesions or inflammatory changes. Air was 

removed as I finished this aspect of the procedure. Representative photographs 

were taken for the medical record of the colon and rectum. There were no 

procedural complications.

## 2017-12-15 NOTE — PCM.CONS
H&P History of Present Illness





- General


Date of Service: 12/15/17


Admit Problem/Dx: 


Anemia 





Source of Information: Patient, RN





- History of Present Illness


Initial Comments - Free Text/Narative: 





47-year-old male with history of cerebral palsy presented to his providers 

office yesterday with a complaint of hiccups and severe heartburn. Lab tests 

were obtained and he was found to have a surprising hemoglobin of 5. The 

patient denied lightheadedness and syncopal episodes. He also denied 

palpitations, shortness of breath, orthopnea, weakness, as well as fatigue. He 

also denied hemoptysis and hematemesis. His appetite has been fine and he's had 

no abdominal pain. His weight has been stable. He has a history of constipation 

but denied bleeding per rectum and changes in color of his stool. He was seen 

in the emergency room and a heme occult was performed and was found to be heme 

positive. He's never had a previous upper endoscopy or colonoscopy. He was 

admitted and received 3 units of red cells. This morning he has no complaints. 

His post transfusion hemoglobin is 9.





- Related Data


Allergies/Adverse Reactions: 


 Allergies











Allergy/AdvReac Type Severity Reaction Status Date / Time


 


aspirin Allergy  Hives Verified 12/14/17 18:09











Home Medications: 


 Home Meds





. [No Known Home Meds]  12/14/17 [History]











Past Medical History


Cardiovascular History: Reports: None


Respiratory History: Reports: None


Gastrointestinal History: Reports: GERD


Musculoskeletal History: Reports: None


Neurological History: Reports: Cerebral Palsy


Endocrine/Metabolic History: Reports: None


Hematologic History: Reports: Anemia


Dermatologic History: Reports: None





- Infectious Disease History


Infectious Disease History: Reports: Chicken Pox





- Past Surgical History


GI Surgical History: Reports: Appendectomy


Endocrine Surgical History: Reports: None


Neurological Surgical History: Reports: None


Musculoskeletal Surgical History: Reports: Other (See Below)


Other Musculoskeletal Surgeries/Procedures:: Hip surgery Left side





Social & Family History





- Family History


Family Medical History: Noncontributory


Cardiac: Reports: Hypertension


Psychiatric: Reports: Depression





- Tobacco Use


Smoking Status *Q: Never Smoker


Second Hand Smoke Exposure: No





- Caffeine Use


Caffeine Use: Reports: None





- Alcohol Use


Days Per Week of Alcohol Use: 1


Number of Drinks Per Day: 1


Total Drinks Per Week: 1





- Recreational Drug Use


Recreational Drug Use: No





H&P Review of Systems





- Review of Systems:


Review Of Systems: See Below





Exam





- Exam


Exam: See Below





- Vital Signs


Vital Signs: 


 Last Vital Signs











Temp  36.8 C   12/15/17 05:55


 


Pulse  93   12/15/17 05:55


 


Resp  18   12/15/17 05:55


 


BP  117/70   12/15/17 05:55


 


Pulse Ox  99   12/15/17 05:55











Weight: 51.075 kg





- Exam


General: Alert, Oriented, Cooperative


HEENT: EOMI, Hearing Intact, Other (Poor dentition)


Neck: Supple, Trachea Midline


Lungs: Normal Respiratory Effort


Cardiovascular: Regular Rate, Regular Rhythm


GI/Abdominal Exam: Soft, Non-Tender, No Mass


 (Male) Exam: Deferred


Rectal (Males) Exam: Deferred


Extremities: Joint Swelling, Other (Bilateral lower extremity muscular atrophy 

with some contracture and limb shortening)


Skin: Warm, Dry, Intact


Psychiatric: Alert, Normal Affect





- Patient Data


Lab Results Last 24 hrs: 


 Laboratory Results - last 24 hr











  12/15/17 12/15/17 Range/Units





  06:00 06:00 


 


WBC  8.87   (4.23-9.07)  K/mm3


 


RBC  4.90   (4.63-6.08)  M/mm3


 


Hgb  9.2 L   (13.7-17.5)  gm/L


 


Hct  31.9 L   (40.1-51.0)  %


 


MCV  65.1 L   (79.0-92.2)  fl


 


MCH  18.8 L   (25.7-32.2)  pg


 


MCHC  28.8 L   (32.2-35.5)  g/dl


 


RDW Std Deviation  67.3 H   (35.1-43.9)  fL


 


Plt Count  578 H   (163-337)  K/mm3


 


MPV  8.5 L   (9.4-12.3)  fl


 


Neut % (Auto)  70.6 H   (34.0-67.9)  %


 


Lymph % (Auto)  18.8 L   (21.8-53.1)  %


 


Mono % (Auto)  6.9   (5.3-12.2)  %


 


Eos % (Auto)  2.9   (0.8-7.0)  


 


Baso % (Auto)  0.7   (0.1-1.2)  %


 


Neut # (Auto)  6.26 H   (1.78-5.38)  K/mm3


 


Lymph # (Auto)  1.67   (1.32-3.57)  K/mm3


 


Mono # (Auto)  0.61   (0.30-0.82)  K/mm3


 


Eos # (Auto)  0.26   (0.04-0.54)  K/mm3


 


Baso # (Auto)  0.06   (0.01-0.08)  K/mm3


 


Sodium   141  (136-145)  mEq/L


 


Potassium   4.1  (3.5-5.1)  mEq/L


 


Chloride   105  ()  mEq/L


 


Carbon Dioxide   27  (21-32)  mEq/L


 


Anion Gap   13.1  (5-15)  


 


BUN   15  (7-18)  mg/dL


 


Creatinine   0.8  (0.7-1.3)  mg/dL


 


Est Cr Clr Drug Dosing   80.69  mL/min


 


Estimated GFR (MDRD)   > 60  (>60)  mL/min


 


BUN/Creatinine Ratio   18.8 H  (14-18)  


 


Glucose   111 H  ()  mg/dL


 


Calcium   8.5  (8.5-10.1)  mg/dL


 


Magnesium   2.0  (1.8-2.4)  mg/dl











Result Diagrams: 


 12/15/17 06:00





 12/15/17 06:00





Consult PN Assessment/Plan


(1) Anemia


SNOMED Code(s): 298872214


   Code(s): D64.9 - ANEMIA, UNSPECIFIED   Priority: High   Current Visit: Yes   


Qualifiers: 


   Anemia type: other cause   Other causes of anemia: other cause, not 

classified   Qualified Code(s): D64.89 - Other specified anemias   





(2) GI bleed


SNOMED Code(s): 25928834


   Code(s): K92.2 - GASTROINTESTINAL HEMORRHAGE, UNSPECIFIED   Priority: High   

Current Visit: Yes   


Qualifiers: 


   GI bleed type/associated pathology: unspecified gastrointestinal hemorrhage 

type   Qualified Code(s): K92.2 - Gastrointestinal hemorrhage, unspecified   


Problem List Initiated/Reviewed/Updated: Yes


My Orders Last 24 Hours: 





imp: Gastrointestinal bleeding source. Bleeding site unknown at this point. 

Agree with diagnostic upper and lower endoscopy. He has remained 

hemodynamically stable.





plan: Diagnostic panendoscopy. He'll need a bowel preparation. I discussed this 

with the patient and he wants to have them done.

## 2017-12-16 NOTE — PCM.DCSUM1
**Discharge Summary





- Hospital Course


Free Text/Narrative:: 











47 year old male with asymptomatic chronic anemia received 3 units PRBCs as 

well as Gen surg consult.  Subsequently had a diagnostic EGD as well as 

colonoscopy.  Type 1 hiatal hernia was noted as well as Villalpando's pathology 

noted.  Please see dictation by general surgeon for details.  Prescriptions at 

DC include Protonix as well as Ferrous Sulfate.  The patient was DCd to home 

after restarting a regular diet and monitoring for signs of bleeding.








Primary Dx


Chronic anemia, Fe deficiency


Villalpando's esophageal pathology


Type I Hiatal hernia


Cerebral Palsy











Condition


Stable











Diet


Regular











Activity


As tolerated











Medications


Protonix


Ferrous Sulfate











Disposition


Home








Follow up


PCP 1-2 weeks








- Discharge Data


Discharge Date: 12/16/17


Discharge Disposition: Home, Self-Care 01


Condition: Good





- Patient Summary/Data


Operative Procedure(s) Performed: 1. Diagnostic Esophagogastroduodenoscopy with 

distal and proximal esophageal biopsies.  2. Diagnostic Colonoscopy


Consults: 


 Consultations





12/14/17 23:46


Consult to Case Management [CONS] Routine 


Consult to Physician [CONS] Routine 


OT Evaluation and Treatment [CONS] Routine 


PT Evaluation and Treatment [CONS] Routine 














- Patient Instructions


Diet: Regular Diet as Tolerated


Activity: As Tolerated


Driving: May Drive Today


Showering/Bathing: May Shower


Notify Provider of: Fever, Nausea and/or Vomiting





- Discharge Plan


Prescriptions/Med Rec: 


Ferrous Sulfate 325 mg PO WITHLUNCH #30 tablet


Pantoprazole [ProTONIX***] 40 mg PO ACBREAKFAST #30 tab.cr


Home Medications: 


 Home Meds





Ferrous Sulfate 325 mg PO WITHLUNCH #30 tablet 12/16/17 [Rx]


Pantoprazole [ProTONIX***] 40 mg PO ACBREAKFAST #30 tab.cr 12/16/17 [Rx]








Patient Handouts:  Gastrointestinal Bleeding, Hiatal Hernia, Iron Deficiency 

Anemia, Adult, Iron-Rich Diet


Referrals: 


Lester Garcia PA-C [Primary Care Provider] - 





- Discharge Summary/Plan Comment


DC Time >30 min.: No





- General Info


Date of Service: 12/14/17


Functional Status: Reports: Pain Controlled, Tolerating Diet, Urinating





- Review of Systems


General: Reports: No Symptoms


HEENT: Reports: No Symptoms


Pulmonary: Reports: No Symptoms


Cardiovascular: Reports: No Symptoms


Gastrointestinal: Reports: No Symptoms


Genitourinary: Reports: No Symptoms


Musculoskeletal: Reports: No Symptoms


Skin: Reports: No Symptoms


Neurological: Reports: No Symptoms


Psychiatric: Reports: No Symptoms





- Patient Data


Vitals - Most Recent: 


 Last Vital Signs











Temp  36.6 C   12/16/17 08:15


 


Pulse  71   12/16/17 08:15


 


Resp  16   12/16/17 08:15


 


BP  108/77   12/16/17 08:15


 


Pulse Ox  97   12/16/17 08:15











Weight - Most Recent: 49.941 kg


I&O - Last 24 hours: 


 Intake & Output











 12/15/17 12/16/17 12/16/17





 22:59 06:59 14:59


 


Intake Total 3720 1450 


 


Output Total 1200 850 


 


Balance 2520 600 











Lab Results - Last 24 hrs: 


 Laboratory Results - last 24 hr











  12/15/17 12/16/17 12/16/17 Range/Units





  06:00 06:20 06:20 


 


WBC   6.53   (4.23-9.07)  K/mm3


 


RBC   4.67   (4.63-6.08)  M/mm3


 


Hgb   9.0 L   (13.7-17.5)  gm/L


 


Hct   30.7 L   (40.1-51.0)  %


 


MCV   65.7 L   (79.0-92.2)  fl


 


MCH   19.3 L   (25.7-32.2)  pg


 


MCHC   29.3 L   (32.2-35.5)  g/dl


 


RDW Std Deviation   67.5 H   (35.1-43.9)  fL


 


Plt Count   504 H   (163-337)  K/mm3


 


MPV   8.4 L   (9.4-12.3)  fl


 


Neut % (Auto)   67.8   (34.0-67.9)  %


 


Lymph % (Auto)   19.8 L   (21.8-53.1)  %


 


Mono % (Auto)   6.6   (5.3-12.2)  %


 


Eos % (Auto)   5.1   (0.8-7.0)  


 


Baso % (Auto)   0.5   (0.1-1.2)  %


 


Neut # (Auto)   4.44   (1.78-5.38)  K/mm3


 


Lymph # (Auto)   1.29 L   (1.32-3.57)  K/mm3


 


Mono # (Auto)   0.43   (0.30-0.82)  K/mm3


 


Eos # (Auto)   0.33   (0.04-0.54)  K/mm3


 


Baso # (Auto)   0.03   (0.01-0.08)  K/mm3


 


Manual Slide Review   Abnormal smear   


 


Sodium    144  (136-145)  mEq/L


 


Potassium    3.9  (3.5-5.1)  mEq/L


 


Chloride    107  ()  mEq/L


 


Carbon Dioxide    28  (21-32)  mEq/L


 


Anion Gap    12.9  (5-15)  


 


BUN    9  (7-18)  mg/dL


 


Creatinine    0.8  (0.7-1.3)  mg/dL


 


Est Cr Clr Drug Dosing    80.69  mL/min


 


Estimated GFR (MDRD)    > 60  (>60)  mL/min


 


BUN/Creatinine Ratio    11.3 L  (14-18)  


 


Glucose    99  ()  mg/dL


 


Calcium    8.3 L  (8.5-10.1)  mg/dL


 


Magnesium    1.8  (1.8-2.4)  mg/dl


 


Iron  22 L    ()  ug/dL


 


TIBC  493 H    (100-400)  ug/dL


 


% Saturation  4 L    (20-55)  %


 


Transferrin  394 H    (202-364)  mg/dL


 


Vitamin B12  308    (193-986)  pg/ml


 


Folate  18.2    (8.6-58.9)  ng/mL











Med Orders - Current: 


 Current Medications





Acetaminophen (Tylenol)  650 mg PO Q4H PRN


   PRN Reason: Pain (Mild 1-3)/fever


Al Hydroxide/Mg Hydroxide (Mag-Al Plus)  30 ml PO Q4H PRN


   PRN Reason: heartburn


   Last Admin: 12/15/17 00:36 Dose:  30 ml


Ferrous Sulfate (Ferrous Sulfate)  325 mg PO WITHBREAKFAST [facility]


   Last Admin: 12/16/17 07:41 Dose:  Not Given


Magnesium Sulfate 2 gm/ Premix  50 mls @ 25 mls/hr IV ONETIME ONE


   Stop: 12/16/17 11:01


Ondansetron HCl (Zofran Odt)  4 mg PO Q6H PRN


   PRN Reason: nausea, able to take PO


Ondansetron HCl (Zofran)  4 mg IV Q6H PRN


   PRN Reason: Nausea/Vomiting


Oxycodone/Acetaminophen (Percocet 325-5 Mg)  1 tab PO Q4H PRN


   PRN Reason: Pain (moderate 4-6)


Pantoprazole Sodium (Protonix***)  40 mg PO BIDAC TERRELL


   Last Admin: 12/16/17 05:23 Dose:  40 mg


Sodium Chloride (Saline Flush)  10 ml FLUSH ASDIRECTED PRN


   PRN Reason: Keep Vein Open


   Last Admin: 12/14/17 20:44 Dose:  10 ml


Temazepam (Restoril)  15 mg PO BEDTIME PRN


   PRN Reason: Sleep





Discontinued Medications





Enoxaparin Sodium (Lovenox)  30 mg SUBCUT DAILY [facility]


Fentanyl (Sublimaze) Confirm Administered Dose 100 mcg .ROUTE .STK-MED ONE


   Stop: 12/15/17 14:31


Sodium Chloride (Normal Saline) Confirm Administered Dose 1,000 mls @ as 

directed .ROUTE .STK-MED ONE


   Stop: 12/14/17 20:44


   Last Admin: 12/14/17 21:02 Dose:  Not Given


Sodium Chloride (Normal Saline)  1,000 mls @ 25 mls/hr IV ONETIME ONE


   Stop: 12/16/17 14:59


   Last Admin: 12/14/17 23:15 Dose:  25 mls/hr


Lidocaine HCl (Xylocaine-Mpf 1%) Confirm Administered Dose 4 mls @ as directed 

.ROUTE .STK-MED ONE


   Stop: 12/15/17 10:22


Lactated Ringer's (Ringers, Lactated)  1,000 mls @ 100 mls/hr IV ONETIME ONE


   Stop: 12/16/17 04:59


   Last Admin: 12/15/17 18:55 Dose:  100 mls/hr


Polyethylene Glycol/Electrolytes (Golytely)  4,000 ml PO ONETIME ONE


   Stop: 12/15/17 08:51


   Last Admin: 12/15/17 09:19 Dose:  4,000 ml


Propofol (Diprivan  20 Ml) Confirm Administered Dose 400 mg .ROUTE .STK-MED ONE


   Stop: 12/15/17 10:23











- Exam


Quality Assessment: Reports: DVT Prophylaxis


General: Reports: Alert, Oriented, Cooperative, No Acute Distress


HEENT: Reports: Pupils Equal, Pupils Reactive, EOMI


Neck: Reports: Trachea Midline, No JVD


Lungs: Reports: Normal Respiratory Effort


Cardiovascular: Reports: Regular Rate, Regular Rhythm


GI/Abdominal Exam: Normal Bowel Sounds, Soft, Non-Tender, No Organomegaly, No 

Distention


 (Male) Exam: Deferred


Rectal (Males) Exam: Deferred


Back Exam: Reports: Normal Inspection


Extremities: Normal Inspection


Skin: Reports: Warm


Neurological: Reports: No New Focal Deficit


Psy/Mental Status: Reports: Alert, Normal Affect, Normal Mood





*Q Meaningful Use (DIS)





- VTE *Q


VTE Criteria *Q: 








- Stroke *Q


Stroke Criteria *Q: 








- AMI *Q


AMI Criteria *Q:

## 2020-04-26 ENCOUNTER — HOSPITAL ENCOUNTER (EMERGENCY)
Dept: HOSPITAL 41 - JD.ED | Age: 50
Discharge: HOME | End: 2020-04-26
Payer: MEDICARE

## 2020-04-26 DIAGNOSIS — K21.9: ICD-10-CM

## 2020-04-26 DIAGNOSIS — D64.9: ICD-10-CM

## 2020-04-26 DIAGNOSIS — R06.6: Primary | ICD-10-CM

## 2020-04-26 DIAGNOSIS — Z88.6: ICD-10-CM

## 2020-04-26 PROCEDURE — 99284 EMERGENCY DEPT VISIT MOD MDM: CPT

## 2020-04-26 NOTE — EDM.PDOC
ED HPI GENERAL MEDICAL PROBLEM





- General


Chief Complaint: General


Stated Complaint: [facility]


Time Seen by Provider: 04/26/20 21:32


Source of Information: Reports: Patient, Old Records, RN Notes Reviewed


History Limitations: Reports: No Limitations





- History of Present Illness


INITIAL COMMENTS - FREE TEXT/NARRATIVE: 





Patient is a 50-year-old male who presents to the ED via [facility] 

service for the evaluation of his chronic hiccups.  The patient notes that he 

takes baclofen for this, and ended up running out of this yesterday morning, he 

states that he has had uncontrolled hiccuping all day on and off since then.  

He states that he is not having any other concerns, no fevers chills, nausea 

vomiting/diarrhea, chest pain, shortness of breath, cough.  He would like to 

get his hiccuping under control.  He sees Angi Desir as a primary care 

physician.  Patient does have a history of cerebral palsy, anemia and GERD.





- Related Data


 Allergies











Allergy/AdvReac Type Severity Reaction Status Date / Time


 


aspirin Allergy  Hives Verified 12/14/17 18:09











Home Meds: 


 Home Meds





Ferrous Sulfate 325 mg PO WITHLUNCH #30 tablet 12/16/17 [Rx]


Pantoprazole [ProTONIX***] 40 mg PO ACBREAKFAST #30 tab.cr 12/16/17 [Rx]


Baclofen 20 mg PO TID 04/26/20 [History]











Past Medical History


Gastrointestinal History: Reports: GERD


Neurological History: Reports: Cerebral Palsy


Hematologic History: Reports: Anemia





- Infectious Disease History


Infectious Disease History: Reports: Chicken Pox





- Past Surgical History


GI Surgical History: Reports: Appendectomy


Musculoskeletal Surgical History: Reports: Other (See Below)


Other Musculoskeletal Surgeries/Procedures:: Hip surgery Left side





Social & Family History





- Family History


Family Medical History: Noncontributory


Cardiac: Reports: Hypertension


Psychiatric: Reports: Depression





- Tobacco Use


Smoking Status *Q: Never Smoker





- Caffeine Use


Caffeine Use: Reports: Soda





- Recreational Drug Use


Recreational Drug Use: No





ED ROS GENERAL





- Review of Systems


Review Of Systems: Comprehensive ROS is negative, except as noted in HPI.





ED EXAM, GENERAL





- Physical Exam


Exam: See Below


Exam Limited By: No Limitations


General Appearance: Alert, WD/WN, No Apparent Distress (pt actively hicupping 

in room)


Respiratory/Chest: No Respiratory Distress, Lungs Clear, Normal Breath Sounds, 

No Accessory Muscle Use, Chest Non-Tender


Cardiovascular: Normal Peripheral Pulses, Regular Rate, Rhythm, No Murmur


GI/Abdominal: Normal Bowel Sounds, Soft, Non-Tender, No Distention, No Mass


Extremities: Normal Inspection, Normal Capillary Refill


Neurological: Alert, Oriented, Normal Cognition, No Motor/Sensory Deficits


Psychiatric: Normal Affect, Normal Mood


Skin Exam: Warm, Dry, Intact, Normal Color, No Rash





Course





- Vital Signs


Last Recorded V/S: 





 Last Vital Signs











Temp  98.3 F   04/26/20 21:11


 


Pulse  127 H  04/26/20 21:11


 


Resp  18   04/26/20 21:11


 


BP  138/94 H  04/26/20 21:11


 


Pulse Ox  97   04/26/20 21:11














- Orders/Labs/Meds


Meds: 





Medications














Discontinued Medications














Generic Name Dose Route Start Last Admin





  Trade Name Laura  PRN Reason Stop Dose Admin


 


Baclofen  20 mg  04/26/20 21:37  





  Lioresal  PO  04/26/20 21:38  





  ONETIME ONE   





     





     





     





     














- Re-Assessments/Exams


Free Text/Narrative Re-Assessment/Exam: 





04/26/20 21:41


Patient was brought to the ER for his hiccups, this is a chronic issue,I will 

provide him with his dose of baclofen tonight, and have him get his 

prescription tomorrow and have it refilled, and take as directed and follow-up 

with his regular care provider as needed.  Patient did not have any other 

complaints, so lab work is not warranted at today's visit.





Departure





- Departure


Time of Disposition: 21:42


Disposition: Home, Self-Care 01


Condition: Good


Clinical Impression: 


 Hiccups








- Discharge Information


*PRESCRIPTION DRUG MONITORING PROGRAM REVIEWED*: No


*COPY OF PRESCRIPTION DRUG MONITORING REPORT IN PATIENT RENETTA: No


Instructions:  Hiccups


Additional Instructions: 


You were evaluated in the ER for your chronic hiccups.





You were given your regular dose of baclofen, and you stated that you will have 

a refill of this medication tomorrow and can start taking as prescribed.





You did not have any other complaints in the ER, so no lab work or further 

investigation was done, as there was no other emergent condition identified.





Please take your baclofen as prescribed, starting tomorrow when it gets refilled

, and follow-up with your primary care provider as needed for further 

evaluation.





Please return to the ER at any time however if symptoms change or worsen.





Sepsis Event Note





- Evaluation


Sepsis Screening Result: No Definite Risk





- Focused Exam


Vital Signs: 





 Vital Signs











  Temp Pulse Resp BP Pulse Ox


 


 04/26/20 21:11  98.3 F  127 H  18  138/94 H  97











Date Exam was Performed: 04/26/20


Time Exam was Performed: 21:38

## 2022-02-21 ENCOUNTER — HOSPITAL ENCOUNTER (EMERGENCY)
Dept: HOSPITAL 41 - JD.ED | Age: 52
LOS: 1 days | Discharge: HOME | End: 2022-02-22
Payer: MEDICARE

## 2022-02-21 DIAGNOSIS — F10.20: ICD-10-CM

## 2022-02-21 DIAGNOSIS — R44.0: ICD-10-CM

## 2022-02-21 DIAGNOSIS — E87.6: ICD-10-CM

## 2022-02-21 DIAGNOSIS — U07.1: Primary | ICD-10-CM

## 2022-02-21 DIAGNOSIS — D64.89: ICD-10-CM

## 2022-02-21 DIAGNOSIS — Z88.8: ICD-10-CM

## 2022-02-21 LAB — APAP SERPL-MCNC: 0 UG/ML (ref 10–30)

## 2022-02-21 PROCEDURE — 84443 ASSAY THYROID STIM HORMONE: CPT

## 2022-02-21 PROCEDURE — 36415 COLL VENOUS BLD VENIPUNCTURE: CPT

## 2022-02-21 PROCEDURE — 80053 COMPREHEN METABOLIC PANEL: CPT

## 2022-02-21 PROCEDURE — 80306 DRUG TEST PRSMV INSTRMNT: CPT

## 2022-02-21 PROCEDURE — 80143 DRUG ASSAY ACETAMINOPHEN: CPT

## 2022-02-21 PROCEDURE — 99285 EMERGENCY DEPT VISIT HI MDM: CPT

## 2022-02-21 PROCEDURE — U0002 COVID-19 LAB TEST NON-CDC: HCPCS

## 2022-02-21 PROCEDURE — 93005 ELECTROCARDIOGRAM TRACING: CPT

## 2022-02-21 PROCEDURE — 83735 ASSAY OF MAGNESIUM: CPT

## 2022-02-21 PROCEDURE — 80307 DRUG TEST PRSMV CHEM ANLYZR: CPT

## 2022-02-21 PROCEDURE — 80179 DRUG ASSAY SALICYLATE: CPT

## 2022-02-21 PROCEDURE — 85025 COMPLETE CBC W/AUTO DIFF WBC: CPT

## 2023-01-09 ENCOUNTER — HOSPITAL ENCOUNTER (EMERGENCY)
Dept: HOSPITAL 41 - JD.ED | Age: 53
Discharge: HOME | End: 2023-01-09
Payer: MEDICARE

## 2023-01-09 DIAGNOSIS — Z90.49: ICD-10-CM

## 2023-01-09 DIAGNOSIS — Z88.6: ICD-10-CM

## 2023-01-09 DIAGNOSIS — R06.6: Primary | ICD-10-CM

## 2023-01-09 DIAGNOSIS — Z79.899: ICD-10-CM

## 2023-01-09 LAB — EGFRCR SERPLBLD CKD-EPI 2021: 101 ML/MIN (ref 60–?)

## 2023-05-18 ENCOUNTER — HOSPITAL ENCOUNTER (EMERGENCY)
Dept: HOSPITAL 41 - JD.ED | Age: 53
LOS: 1 days | Discharge: HOME | End: 2023-05-19
Payer: MEDICARE

## 2023-05-18 DIAGNOSIS — Z88.8: ICD-10-CM

## 2023-05-18 DIAGNOSIS — Z79.899: ICD-10-CM

## 2023-05-18 DIAGNOSIS — R06.6: Primary | ICD-10-CM

## 2023-05-18 DIAGNOSIS — K21.9: ICD-10-CM

## 2023-05-18 LAB
ALBUMIN SERPL-MCNC: 3.6 G/DL (ref 3.4–5)
ALBUMIN/GLOB SERPL: 1 {RATIO} (ref 1–2)
ALP SERPL-CCNC: 147 U/L (ref 46–116)
ALT SERPL-CCNC: 19 U/L (ref 16–63)
ANION GAP SERPL CALC-SCNC: 14.2 MMOL/L (ref 5–15)
AST SERPL-CCNC: 13 U/L (ref 15–37)
BASOPHILS # BLD AUTO: 0.02 K/MM3 (ref 0.01–0.08)
BASOPHILS NFR BLD AUTO: 0.2 % (ref 0.1–1.2)
BILIRUB SERPL-MCNC: 0.8 MG/DL (ref 0.2–1)
BUN SERPL-MCNC: 16 MG/DL (ref 7–18)
BUN/CREAT SERPL: 17.8 (ref 14–18)
CALCIUM SERPL-MCNC: 8.2 MG/DL (ref 8.5–10.1)
CHLORIDE SERPL-SCNC: 100 MEQ/L (ref 98–107)
CO2 SERPL-SCNC: 24 MEQ/L (ref 21–32)
CREAT CL 24H UR+SERPL-VRATE: 68.42 ML/MIN
CREAT SERPL-MCNC: 0.9 MG/DL (ref 0.7–1.3)
EGFRCR SERPLBLD CKD-EPI 2021: 101 ML/MIN (ref 60–?)
EOSINOPHIL # BLD AUTO: 0.01 K/MM3 (ref 0.04–0.54)
EOSINOPHIL NFR BLD AUTO: 0.1 % (ref 0.8–7)
GLOBULIN SER-MCNC: 3.8 GM/DL
GLUCOSE SERPL-MCNC: 142 MG/DL (ref 70–99)
HCT VFR BLD AUTO: 53.8 % (ref 40.1–51)
HGB BLD-MCNC: 18.1 GM/DL (ref 13.7–17.5)
IMM GRANULOCYTES # BLD: 0.02 K/MM3 (ref 0–0.1)
IMM GRANULOCYTES NFR BLD: 0.2 % (ref ?–1)
LYMPHOCYTES # BLD AUTO: 0.67 K/MM3 (ref 1.32–3.57)
LYMPHOCYTES NFR BLD AUTO: 5.3 % (ref 21.8–53.1)
MCH RBC QN AUTO: 28.8 PG (ref 25.7–32.2)
MCHC RBC AUTO-ENTMCNC: 33.6 G/DL (ref 32.2–35.5)
MCHC RBC AUTO-ENTMCNC: 85.5 FL (ref 79–92.2)
MONOCYTES # BLD AUTO: 0.8 K/MM3 (ref 0.3–0.82)
MONOCYTES NFR BLD AUTO: 6.3 % (ref 5.3–12.2)
NEUTROPHILS # BLD AUTO: 11.1 K/MM3 (ref 1.78–5.38)
NEUTROPHILS NFR BLD AUTO: 87.9 % (ref 34–67.9)
PLATELET # BLD AUTO: 393 K/MM3 (ref 163–337)
PMV BLD AUTO: 9.1 FL (ref 9.4–12.3)
POTASSIUM SERPL-SCNC: 3.2 MEQ/L (ref 3.5–5.1)
PROT SERPL-MCNC: 7.4 G/DL (ref 6.4–8.2)
RBC # BLD AUTO: 6.29 M/MM3 (ref 4.63–6.08)
SODIUM SERPL-SCNC: 135 MEQ/L (ref 136–145)
WBC # BLD AUTO: 12.62 K/MM3 (ref 4.23–9.07)

## 2023-05-18 PROCEDURE — C9113 INJ PANTOPRAZOLE SODIUM, VIA: HCPCS

## 2023-05-18 PROCEDURE — 99283 EMERGENCY DEPT VISIT LOW MDM: CPT

## 2023-05-18 PROCEDURE — 96375 TX/PRO/DX INJ NEW DRUG ADDON: CPT

## 2023-05-18 PROCEDURE — 80053 COMPREHEN METABOLIC PANEL: CPT

## 2023-05-18 PROCEDURE — 96365 THER/PROPH/DIAG IV INF INIT: CPT

## 2023-05-18 PROCEDURE — 36415 COLL VENOUS BLD VENIPUNCTURE: CPT

## 2023-05-18 PROCEDURE — 85025 COMPLETE CBC W/AUTO DIFF WBC: CPT

## 2023-05-18 PROCEDURE — 96361 HYDRATE IV INFUSION ADD-ON: CPT

## 2024-09-05 ENCOUNTER — HOSPITAL ENCOUNTER (EMERGENCY)
Dept: HOSPITAL 41 - JD.ED | Age: 54
LOS: 1 days | Discharge: HOME | End: 2024-09-06
Payer: MEDICARE

## 2024-09-05 DIAGNOSIS — Z90.49: ICD-10-CM

## 2024-09-05 DIAGNOSIS — Z79.899: ICD-10-CM

## 2024-09-05 DIAGNOSIS — R06.6: Primary | ICD-10-CM

## 2024-09-05 DIAGNOSIS — G80.1: ICD-10-CM

## 2024-09-05 DIAGNOSIS — Z88.8: ICD-10-CM

## 2024-09-05 LAB
ALBUMIN SERPL-MCNC: 4.6 G/DL (ref 3.4–5)
ALBUMIN/GLOB SERPL: 1.2 {RATIO} (ref 1–2)
ALP SERPL-CCNC: 83 U/L (ref 46–116)
ALT SERPL-CCNC: 17 U/L (ref 16–63)
ANION GAP SERPL CALC-SCNC: 15.6 MMOL/L (ref 5–15)
AST SERPL-CCNC: 15 U/L (ref 15–37)
BASOPHILS # BLD AUTO: 0.1 K/MM3 (ref 0–0.2)
BASOPHILS NFR BLD AUTO: 0.4 % (ref 0–1)
BILIRUB SERPL-MCNC: 0.8 MG/DL (ref 0.2–1)
BUN SERPL-MCNC: 8 MG/DL (ref 7–18)
BUN/CREAT SERPL: 7.3 (ref 14–18)
CALCIUM SERPL-MCNC: 9.3 MG/DL (ref 8.5–10.1)
CHLORIDE SERPL-SCNC: 101 MEQ/L (ref 98–107)
CO2 SERPL-SCNC: 23 MEQ/L (ref 21–32)
CREAT CL 24H UR+SERPL-VRATE: 55.32 ML/MIN
CREAT SERPL-MCNC: 1.1 MG/DL (ref 0.7–1.3)
EGFRCR SERPLBLD CKD-EPI 2021: 79 ML/MIN (ref 60–?)
EOSINOPHIL # BLD AUTO: 0 K/MM3 (ref 0–0.4)
EOSINOPHIL NFR BLD AUTO: 0.1 % (ref 0–6)
GLOBULIN SER-MCNC: 3.7 GM/DL
GLUCOSE SERPL-MCNC: 123 MG/DL (ref 70–99)
HCT VFR BLD AUTO: 52 % (ref 42–52)
HGB BLD-MCNC: 17.4 GM/DL (ref 14–18)
IMM GRANULOCYTES # BLD: 0.08 K/MM3 (ref 0–0.05)
IMM GRANULOCYTES NFR BLD: 0.4 % (ref 0–0.4)
LACTATE SERPL-SCNC: 1.1 MMOL/L (ref 0.4–2)
LYMPHOCYTES # BLD AUTO: 1.4 K/MM3 (ref 1–4.8)
LYMPHOCYTES NFR BLD AUTO: 7.5 % (ref 24–44)
MCH RBC QN AUTO: 29.2 PG (ref 28–32)
MCHC RBC AUTO-ENTMCNC: 33.5 G/DL (ref 32–36)
MCHC RBC AUTO-ENTMCNC: 87.2 FL (ref 83–99)
MONOCYTES # BLD AUTO: 0.9 K/MM3 (ref 0–0.8)
MONOCYTES NFR BLD AUTO: 5 % (ref 0–8)
NEUTROPHILS # BLD AUTO: 15.9 K/MM3 (ref 1.8–7.7)
NEUTROPHILS NFR BLD AUTO: 86.6 % (ref 41–71)
NRBC BLD AUTO-RTO: 0 % (ref 0–0.02)
NRBC BLD AUTO-RTO: 0 % (ref 0–0.2)
PLATELET # BLD AUTO: 455 K/MM3 (ref 150–400)
PMV BLD AUTO: 8.8 FL (ref 9.4–12.4)
POTASSIUM SERPL-SCNC: 3.6 MEQ/L (ref 3.5–5.1)
PROT SERPL-MCNC: 8.3 G/DL (ref 6.4–8.2)
RBC # BLD AUTO: 5.96 M/MM3 (ref 4.52–5.9)
SODIUM SERPL-SCNC: 136 MEQ/L (ref 136–145)
WBC # BLD AUTO: 18.38 K/MM3 (ref 3.9–11.3)

## 2024-09-05 PROCEDURE — 71045 X-RAY EXAM CHEST 1 VIEW: CPT

## 2024-09-05 PROCEDURE — 85025 COMPLETE CBC W/AUTO DIFF WBC: CPT

## 2024-09-05 PROCEDURE — 96375 TX/PRO/DX INJ NEW DRUG ADDON: CPT

## 2024-09-05 PROCEDURE — 96374 THER/PROPH/DIAG INJ IV PUSH: CPT

## 2024-09-05 PROCEDURE — 80053 COMPREHEN METABOLIC PANEL: CPT

## 2024-09-05 PROCEDURE — 36415 COLL VENOUS BLD VENIPUNCTURE: CPT

## 2024-09-05 PROCEDURE — 83605 ASSAY OF LACTIC ACID: CPT

## 2024-09-05 PROCEDURE — 96361 HYDRATE IV INFUSION ADD-ON: CPT

## 2024-09-05 PROCEDURE — 93005 ELECTROCARDIOGRAM TRACING: CPT

## 2024-09-05 PROCEDURE — 99284 EMERGENCY DEPT VISIT MOD MDM: CPT

## 2024-09-06 ENCOUNTER — TRANSCRIBE ORDERS (OUTPATIENT)
Dept: OTHER | Age: 54
End: 2024-09-06

## 2024-09-06 DIAGNOSIS — R06.6 INTRACTABLE HICCUPS: ICD-10-CM

## 2024-09-06 DIAGNOSIS — G80.1 SPASTIC CEREBRAL PALSY (H): Primary | ICD-10-CM

## 2024-09-09 ENCOUNTER — TELEPHONE (OUTPATIENT)
Dept: PHYSICAL MEDICINE AND REHAB | Facility: CLINIC | Age: 54
End: 2024-09-09
Payer: MEDICARE

## 2024-09-09 NOTE — TELEPHONE ENCOUNTER
[facility] Call Center    Phone Message    May a detailed message be left on voicemail: no     Reason for Call: Other: Precious called to schedule this patient for his appointment on 10/8/24. Precious states that they are coming from [place] and would like a call back to discuss what all can be done at this appointment since they are coming from a ways away. Please call back.       Action Taken: Message routed to:  [facility]    Travel Screening: Not Applicable

## 2024-09-11 NOTE — TELEPHONE ENCOUNTER
Writer spoke with patient's Niece (Precious) who will accompany patient to his upcoming appointment with Dr. Medina. Patient's Niece had questions about what patient's upcoming appointment will entail. Writer explained that the appointment is a New PMR appointment with Dr. Medina who will review patient's medical history, review current diagnosis in relation to the PMR referral, provide an assessment and determine an appropriate plan of care to address the current problems i.e., spasticity, hiccups. Patient's Niece had no further questions and states patient is looking forward to his appointment. Rivka Carrion RN on 9/11/2024 at 12:51 PM

## 2024-10-08 ENCOUNTER — OFFICE VISIT (OUTPATIENT)
Dept: PHYSICAL MEDICINE AND REHAB | Facility: CLINIC | Age: 54
End: 2024-10-08
Payer: COMMERCIAL

## 2024-10-08 VITALS
HEART RATE: 83 BPM | RESPIRATION RATE: 16 BRPM | DIASTOLIC BLOOD PRESSURE: 95 MMHG | OXYGEN SATURATION: 98 % | SYSTOLIC BLOOD PRESSURE: 141 MMHG

## 2024-10-08 DIAGNOSIS — R06.6 INTRACTABLE HICCUPS: Primary | ICD-10-CM

## 2024-10-08 DIAGNOSIS — G80.1 CP (CEREBRAL PALSY), SPASTIC, DIPLEGIC (H): ICD-10-CM

## 2024-10-08 DIAGNOSIS — K21.00 GASTROESOPHAGEAL REFLUX DISEASE WITH ESOPHAGITIS WITHOUT HEMORRHAGE: ICD-10-CM

## 2024-10-08 PROCEDURE — 99205 OFFICE O/P NEW HI 60 MIN: CPT | Performed by: PHYSICAL MEDICINE & REHABILITATION

## 2024-10-08 RX ORDER — ESOMEPRAZOLE MAGNESIUM 40 MG/1
40 CAPSULE, DELAYED RELEASE ORAL
COMMUNITY
Start: 2024-09-24

## 2024-10-08 RX ORDER — CHLORPROMAZINE HYDROCHLORIDE 25 MG/1
25 TABLET, FILM COATED ORAL 2 TIMES DAILY
COMMUNITY

## 2024-10-08 NOTE — NURSING NOTE
Chief Complaint   Patient presents with    New Patient     Here for consult, confirmed with patient     Sosa Martinez

## 2024-10-08 NOTE — PROGRESS NOTES
"       PM&R Clinic Note     Patient Name: Roque Yang : 1970 Medical Record: [id number]     Requesting Physician/clinician: Collin Majano            History of Present Illness:     Roque Yang is a 54 year old male who presented to clinic with his sister Sandy and niece Precious for more evaluation and management of refractory hiccups. The referral was from his PCP, Dr. Collin Majano affiliated with [facility].     He also has a history of spastic diplegic cerebral palsy, and when I reviewed his chart this morning, I assumed that was the reason for today s visit. Unfortunately, when they called, they spoke with a member of our nursing team who mistakenly gave them the impression that our clinic manages refractory hiccups. They were understandably very frustrated with that. Our team member didn't promise anything but mentioned that we might be able to try botox injections for the management of his symptoms.     Per chart review, he has had extensive work-up at outside facility for the past 10 years regarding his hiccups and has seen many providers including his PCP, GI, ENT, cardiology, neurology and neurosurgery teams. He was at [facility] for about 1 week for full work-up but no clear treatment option was offered per their report.     He has been to ED several times over the past year due to his hiccups and associated symptoms. He was recently admitted at  [facility] 2024 and discharged on 2024 for intractable hiccups. Discharge summary noted that \"Patient was treated successfully with reglan through the ER. He now continue to have episodic hiccups. Wants referral to [facility]   This is a recurrent problem. The current episode started 1 to 4 weeks ago. The problem occurs daily. The problem has been waxing and waning. Associated symptoms include nausea. Pertinent negatives include no anorexia, change in bowel habit, chest pain, congestion, coughing or myalgias. Nothing aggravates the " "symptoms. Treatments tried: has tried neurontin, baclofen, reglan. The treatment provided mild relief.\".    Reviewed neurology team note at [facility] 10/16/23  chronic singultus for at least 10 years. He has also had a long history of GERD. He notes improvement in the hiccups with the use of prilosec. He has had GERD for years. He also states that drinking a cola seems to improve the symptoms     Patient with singultus likely secondary to GI issues as the prilosec does seem to help. He will benefit with investigation for esophageal ulcer/irritation associated with his symptoms. I have ordered mri brain and cervical spine to evaluate for cns lesions in the medullary area where there is a central cause of singultus (I suspect this will be negative given the description of improvement). If imaging negative, fu with ENT and GI for further evaluation and treatment     Today, Roque and his family noted that MRI results were unremarkable and ENT team offered surgery for correction of deviated septum. But he was some treatment for his hiccups first.       Symptoms,  He has been dealing with hiccups for at least 10 years.  He cannot remember any specific event or triggers at that point.  His hiccups are associated with nausea pain and significant discomfort in his abdominal area.  Symptoms have been progressively worse over the past few years and he gets more and more frustrated every day and every week.    He has hiccups about 90% of the day every day which impacts his ability to eat, drink or even his sleep.  He has tried several medications in the past as listed below with minimal to no relief per his report.  He often gags himself to get some relief and control the severe spasms of his diaphragm.    He sleeps for about couple of hours after taking Thorazine but wakes up again in the middle of the night but hiccups and cannot fall back asleep.  He also has acid reflux and feels the burning sensation along his esophagus and " epigastric area as well.  He cannot think of any specific trigger or any specific alleviating factor that he can try.  Drinks a lot of pop and feels like carbonation helps with the severity of symptoms on and off.    Hiccups are the primary issue that impacts his day-to-day and quality of life.  He has some symptoms associated with his history of CP including tightness and severe spasms in his legs, very mild right hip discomfort (noted that he does not have any hip joint on the right side), and weakness at bilateral lower extremities more than bilateral upper extremities.  No sensory loss.  No issues with his bowel or bladder function.  He has lost a lot of weight over the past few months due to inability to eat or drink in the setting of persistent hiccups.      Meds/interventions,   Nexium daily   Thorazine at nighttime which helps with his sleep for couple of hours  Baclofen -not sure about the dose but they were confident that the max dose was tried and discontinued due to no benefit  Reglan -provided temporary relief but was discontinued due to no open  Gabapentin -same as back  Lorazepma -same as Reglan      Therapies/HEP/equipments,  He has not had any formal therapies that he can remember.  He has a manual wheelchair that is at least 15 years old and was never custom for him.  He also has a power wheelchair that was given to him and is not accustomed either.  No other equipments or bracing.      Functionally/social situation,   Used to live in [place] and moved to [place] and currently lives with her mother.  They live in an apartment with full elevator access.  He is in his wheelchair all day and transfers using his arms and squat pivot method.  He used to have crutches early in life but has not on any standing or walking for as long as he can remember.  He sleeps in a regular bed and likes to sleep on his back but that triggers hiccups so he tends to sleep on his side.  Uses regular toilet and is able  to transfer from his wheelchair to the toilet with the same method.  No smoking but occasionally drinks beer.  He is modified independent with all mobility and ADLs.  He noted that everything just takes longer time but he is able to do that.  Dressing is difficult due to spasticity in bilateral lower extremities, same as toileting and transfers.  No driving.  Left hand dominant.  His niece Precious recently quit her job and has been helping him throughout the day.  This has been very beneficial and he really appreciates that.           Past Medical and Surgical History:     No past medical history on file.  No past surgical history on file.         Social History:     Social History     Tobacco Use    Smoking status: Never    Smokeless tobacco: Never   Substance Use Topics    Alcohol use: Not Currently            Family History:     No family history on file.         Medications:     Current Outpatient Medications   Medication Sig Dispense Refill    esomeprazole (NEXIUM) 40 MG DR capsule Take 40 mg by mouth.      chlorproMAZINE (THORAZINE) 25 MG tablet Take 25 mg by mouth 2 times daily.              Allergies:     Allergies   Allergen Reactions    Aspirin Hives and Rash              ROS:     A focused ROS is negative other than the symptoms noted above in the HPI.           Physical Examiniation:     VITAL SIGNS: BP (!) 141/95 (BP Location: Right arm, Patient Position: Sitting, Cuff Size: Adult Regular)   Pulse 83   Resp 16   SpO2 98%   BMI: There is no height or weight on file to calculate BMI.    Gen: NAD, pleasant and cooperative   Cardio: regular pulse  Pulm: non-labored breathing in room air  Abd: benign; no tenderness to palpation and no distention  Ext: WWP, no edema in BLE, no tenderness in calves  Neuro/MSK:   Noted diffuse muscle atrophy especially in bilateral lower extremity  Forward head posture with upper thoracic kyphosis and limited range of motion at cervical spine; he was able to get close to  upright posture with his neck but needed multiple pillows to support resting supine in bed due to forward neck posture  Active and passive range of motion was relatively preserved at bilateral shoulder and upper extremities.  Strength was also intact her bilateral upper extremities with a slight discomfort with resistance at shoulder abduction  Sensation was intact in all extremities  He had 1-2 out of 5 strength at bilateral hip flexion, knee extension and dorsiflexion, slightly better on the right side  Tone was 3 out of 4 at bilateral hip flexion, hip adduction, knee flexion and plantarflexion/inversion.  He seemed to have contractures at all of these joints as well.  Noted clear scissoring of the legs and difficulty with moving them apart when trying to transfer to the bed  He also had clonus in bilateral lower extremity and ankles with any movement.  When laying in bed, passive range of motion of hips did not cause any significant pain or symptoms again he had limited range of motion's due to contracture           Laboratory/Imaging:     Reviewed EKG, x-ray, endoscopy report and colonoscopy report in Care Everywhere  Could not find brain or C-spine MRI result           Assessment/Plan:     Chronic singultus  GERD  Spastic diplegic CP       They were very frustrated with the incorrect information they received regarding the purpose of this visit which was understandable. They were also distressed by the limited treatment options available for his hiccups and the significant impact this has had on his quality of life. His niece even raised the concern that, given his age and the fact that he is unable to eat, drink, or sleep--resulting in continuous weight loss--this situation may present an ethical issue.    I sincerely apologized on behalf of myself and our team for the scheduling error and told them that I would consult with other team members to explore potential options. I offered baclofen as a re-trial for  managing his hiccups as well as his spasticity, but he declined. I also discussed possible options for wheelchair use, equipment, and therapies, but he expressed a preference for addressing his hiccup symptoms and achieving better control of them before considering any other rehabilitation options.    After our visit I called her neurosurgery physician on-call and after that talked to another colleague within the neurosurgery group.  None of them were aware of any procedure being done at the [facility] for refractory hiccups but he was willing to reach out to other colleagues and explore potential options.    I also talked to our service  and plan to discuss his case at our service line meeting tomorrow morning.    Patient education: In depth discussion and education was provided about the assessment and implications of each of the below recommendations for management. Patient indicated readiness to learn, all questions were answered and understanding of material presented was confirmed.    Work-up: None today    Therapy/equipment/braces: Will benefit from new custom manual versus power chair and more evaluation at our seating clinic.  Will also benefit from therapies either at home or outpatient (will likely start from home care and transition to outpatient when ready).    Medications: See above, no changes today    Interventions: None today; he will benefit from trial of neurotoxin injections for better control of his spasticity and spasms.  An intrathecal baclofen pump can also be considered.     Referral / follow up with other providers: No new referrals were placed today    Follow up: I am happy to see him again in clinic regarding his rehab needs and equipments but will defer that for now per their request    Kalyn Medina MD  Physical Medicine & Rehabilitation      **  10/09/24   We discussed his case at our meeting today, and I called Roque Stapleton s sister, to provide an update. I acknowledged  and apologized again for the scheduling issues and told them that I will continue to follow up.    I also explained that while they may choose to remain in the area until they hear back from me, I cannot provide a clear timeline, as there are no immediate options available from the providers I have consulted with so far. I am happy to update them as soon as I hear back.     --Talked to the movement disorder team and will wait to hear back from them  --Called Dr. Majano and recommended psychology and palliative care team referrals.  He agreed with the psychology referral and will consider palliative care in the future.  I will fax my clinic note when completed and also gave him my email address so we can stay in contact. Fax # [phone]  --Ask our scheduling staff to get authorization for [facility] records in Care Everywhere which was completed today    **  10/10/24   I contacted Precious Nevarez, and Sandy to provide updates on the plan below and addressed their questions:    --Neurosurgery Consult: A consult has been placed. Per Dr. Fischer, it will be scheduled in a few months. We need GI team at [facility] to outline the case for surgical intervention, ensuring they assess and confirm the diagnosis of a treatment-refractory state.  --Movement Disorder Neurology Consult: The case was reviewed with Dr. Chao.  --Spasticity Management: Recommended establishing care at the PM&R clinic at [facility] for comprehensive spasticity management, including medications, therapies, neurotoxin injections, and equipment needs.      Precious asked about the SNIFF test, but I advised deferring any further work-up until we have more clarity on potential treatment options moving forward.    They requested that I contact Candi from [employer] [place] ([phone]), which I did. Candi said  that they would need to submit additional stay requests for any future visits.    I also reached out to Dr. Majano s office and spoke with his nurse, Nathalia.  I requested a referral to the local PM&R clinic, noting that [facility]) has available PM&R physicians. Additionally, I asked if Dr. Majano could potentially communicate with the GI team before Roque's next appointment on 10/18 to document that his GI issues are maximally managed medically as above.

## 2024-10-08 NOTE — LETTER
"10/8/2024       RE: Roque Yang  [address]  [address]  [place] [address]     Dear Colleague,    Thank you for referring your patient, Roque Yang, to the [facility] PHYSICAL MEDICINE AND REHABILITATION CLINIC [place] at [facility]. Please see a copy of my visit note below.           PM&R Clinic Note     Patient Name: Roque Yang : 1970 Medical Record: [id number]     Requesting Physician/clinician: Collin Majano            History of Present Illness:     Roque Yang is a 54 year old male who presented to clinic with his sister Sandy and niece Precious for more evaluation and management of refractory hiccups. The referral was from his PCP, Dr. Collin Majano affiliated with [facility].     He also has a history of spastic diplegic cerebral palsy, and when I reviewed his chart this morning, I assumed that was the reason for today s visit. Unfortunately, when they called, they spoke with a member of our nursing team who mistakenly gave them the impression that our clinic manages refractory hiccups. They were understandably very frustrated with that. Our team member didn't promise anything but mentioned that we might be able to try botox injections for the management of his symptoms.     Per chart review, he has had extensive work-up at outside facility for the past 10 years regarding his hiccups and has seen many providers including his PCP, GI, ENT, cardiology, neurology and neurosurgery teams. He was at [facility] for about 1 week for full work-up but no clear treatment option was offered per their report.     He has been to ED several times over the past year due to his hiccups and associated symptoms. He was recently admitted at  [facility] 2024 and discharged on 2024 for intractable hiccups. Discharge summary noted that \"Patient was treated successfully with reglan through the ER. He now continue to have " "episodic hiccups. Wants referral to [place]   This is a recurrent problem. The current episode started 1 to 4 weeks ago. The problem occurs daily. The problem has been waxing and waning. Associated symptoms include nausea. Pertinent negatives include no anorexia, change in bowel habit, chest pain, congestion, coughing or myalgias. Nothing aggravates the symptoms. Treatments tried: has tried neurontin, baclofen, reglan. The treatment provided mild relief.\".    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 He has also had a long history of GERD. He notes improvement in the hiccups with the use of prilosec. He has had GERD for years. He also states that drinking a cola seems to improve the symptoms     Patient with singultus likely secondary to GI issues as the prilosec does seem to help. He will benefit with investigation for esophageal ulcer/irritation associated with his symptoms. I have ordered mri brain and cervical spine to evaluate for cns lesions in the medullary area where there is a central cause of singultus (I suspect this will be negative given the description of improvement). If imaging negative, fu with ENT and GI for further evaluation and treatment     Today, Roque and his family noted that MRI results were unremarkable and ENT team offered surgery for correction of deviated septum. But he was some treatment for his hiccups first.       Symptoms,  He has been dealing with hiccups for at least 10 years.  He cannot remember any specific event or triggers at that point.  His hiccups are associated with nausea pain and significant discomfort in his abdominal area.  Symptoms have been progressively worse over the past few years and he gets more and more frustrated every day and every week.    He has hiccups about 90% of the day every day which impacts his ability to eat, drink or even his sleep.  He has tried several medications in the past as listed below with " minimal to no relief per his report.  He often gags himself to get some relief and control the severe spasms of his diaphragm.    He sleeps for about couple of hours after taking Thorazine but wakes up again in the middle of the night but hiccups and cannot fall back asleep.  He also has acid reflux and feels the burning sensation along his esophagus and epigastric area as well.  He cannot think of any specific trigger or any specific alleviating factor that he can try.  Drinks a lot of pop and feels like carbonation helps with the severity of symptoms on and off.    Hiccups are the primary issue that impacts his day-to-day and quality of life.  He has some symptoms associated with his history of CP including tightness and severe spasms in his legs, very mild right hip discomfort (noted that he does not have any hip joint on the right side), and weakness at bilateral lower extremities more than bilateral upper extremities.  No sensory loss.  No issues with his bowel or bladder function.  He has lost a lot of weight over the past few months due to inability to eat or drink in the setting of persistent hiccups.      Meds/interventions,   Nexium daily   Thorazine at nighttime which helps with his sleep for couple of hours  Baclofen -not sure about the dose but they were confident that the max dose was tried and discontinued due to no benefit  Reglan -provided temporary relief but was discontinued due to no open  Gabapentin -same as back  Lorazepma -same as Reglan      Therapies/HEP/equipments,  He has not had any formal therapies that he can remember.  He has a manual wheelchair that is at least 15 years old and was never custom for him.  He also has a power wheelchair that was given to him and is not accustomed either.  No other equipments or bracing.      Functionally/social situation,   Used to live in [place] and moved to [place] and currently lives with her mother.  They live in an apartment with full elevator  access.  He is in his wheelchair all day and transfers using his arms and squat pivot method.  He used to have crutches early in life but has not on any standing or walking for as long as he can remember.  He sleeps in a regular bed and likes to sleep on his back but that triggers hiccups so he tends to sleep on his side.  Uses regular toilet and is able to transfer from his wheelchair to the toilet with the same method.  No smoking but occasionally drinks beer.  He is modified independent with all mobility and ADLs.  He noted that everything just takes longer time but he is able to do that.  Dressing is difficult due to spasticity in bilateral lower extremities, same as toileting and transfers.  No driving.  Left hand dominant.  His niece Precious recently quit her job and has been helping him throughout the day.  This has been very beneficial and he really appreciates that.           Past Medical and Surgical History:     No past medical history on file.  No past surgical history on file.         Social History:     Social History     Tobacco Use     Smoking status: Never     Smokeless tobacco: Never   Substance Use Topics     Alcohol use: Not Currently            Family History:     No family history on file.         Medications:     Current Outpatient Medications   Medication Sig Dispense Refill     esomeprazole (NEXIUM) 40 MG DR capsule Take 40 mg by mouth.       chlorproMAZINE (THORAZINE) 25 MG tablet Take 25 mg by mouth 2 times daily.              Allergies:     Allergies   Allergen Reactions     Aspirin Hives and Rash              ROS:     A focused ROS is negative other than the symptoms noted above in the HPI.           Physical Examiniation:     VITAL SIGNS: BP (!) 141/95 (BP Location: Right arm, Patient Position: Sitting, Cuff Size: Adult Regular)   Pulse 83   Resp 16   SpO2 98%   BMI: There is no height or weight on file to calculate BMI.    Gen: NAD, pleasant and cooperative   Cardio: regular  pulse  Pulm: non-labored breathing in room air  Abd: benign; no tenderness to palpation and no distention  Ext: WWP, no edema in BLE, no tenderness in calves  Neuro/MSK:   Noted diffuse muscle atrophy especially in bilateral lower extremity  Forward head posture with upper thoracic kyphosis and limited range of motion at cervical spine; he was able to get close to upright posture with his neck but needed multiple pillows to support resting supine in bed due to forward neck posture  Active and passive range of motion was relatively preserved at bilateral shoulder and upper extremities.  Strength was also intact her bilateral upper extremities with a slight discomfort with resistance at shoulder abduction  Sensation was intact in all extremities  He had 1-2 out of 5 strength at bilateral hip flexion, knee extension and dorsiflexion, slightly better on the right side  Tone was 3 out of 4 at bilateral hip flexion, hip adduction, knee flexion and plantarflexion/inversion.  He seemed to have contractures at all of these joints as well.  Noted clear scissoring of the legs and difficulty with moving them apart when trying to transfer to the bed  He also had clonus in bilateral lower extremity and ankles with any movement.  When laying in bed, passive range of motion of hips did not cause any significant pain or symptoms again he had limited range of motion's due to contracture           Laboratory/Imaging:     Reviewed EKG, x-ray, endoscopy report and colonoscopy report in Care Everywhere  Could not find brain or C-spine MRI result           Assessment/Plan:     Chronic singultus  GERD  Spastic diplegic CP       They were very frustrated with the incorrect information they received regarding the purpose of this visit which was understandable. They were also distressed by the limited treatment options available for his hiccups and the significant impact this has had on his quality of life. His niece even raised the concern  that, given his age and the fact that he is unable to eat, drink, or sleep--resulting in continuous weight loss--this situation may present an ethical issue.    I sincerely apologized on behalf of myself and our team for the scheduling error and told them that I would consult with other team members to explore potential options. I offered baclofen as a re-trial for managing his hiccups as well as his spasticity, but he declined. I also discussed possible options for wheelchair use, equipment, and therapies, but he expressed a preference for addressing his hiccup symptoms and achieving better control of them before considering any other rehabilitation options.    After our visit I called her neurosurgery physician on-call and after that talked to another colleague within the neurosurgery group.  None of them were aware of any procedure being done at the [facility] for refractory hiccups but he was willing to reach out to other colleagues and explore potential options.    I also talked to our service  and plan to discuss his case at our service line meeting tomorrow morning.    Patient education: In depth discussion and education was provided about the assessment and implications of each of the below recommendations for management. Patient indicated readiness to learn, all questions were answered and understanding of material presented was confirmed.    Work-up: None today    Therapy/equipment/braces: Will benefit from new custom manual versus power chair and more evaluation at our seating clinic.  Will also benefit from therapies either at home or outpatient (will likely start from home care and transition to outpatient when ready).    Medications: See above, no changes today    Interventions: None today; he will benefit from trial of neurotoxin injections for better control of his spasticity and spasms.  An intrathecal baclofen pump can also be considered.     Referral / follow up with other providers:  No new referrals were placed today    Follow up: I am happy to see him again in clinic regarding his rehab needs and equipments but will defer that for now per their request    Kalyn Medina MD  Physical Medicine & Rehabilitation      **  10/09/24   We discussed his case at our meeting today, and I called Roque Stapleton s sister, to provide an update. I acknowledged and apologized again for the scheduling issues and told them that I will continue to follow up.    I also explained that while they may choose to remain in the area until they hear back from me, I cannot provide a clear timeline, as there are no immediate options available from the providers I have consulted with so far. I am happy to update them as soon as I hear back.     --Talked to the movement disorder team and will wait to hear back from them  --Called Dr. Majano and recommended psychology and palliative care team referrals.  He agreed with the psychology referral and will consider palliative care in the future.  I will fax my clinic note when completed and also gave him my email address so we can stay in contact. Fax # [phone]  -- Ask our scheduling staff to get authorization for [facility] records in Care Everywhere which was completed today        Again, thank you for allowing me to participate in the care of your patient.      Sincerely,    Kalyn Medina MD

## 2024-10-09 ENCOUNTER — TELEPHONE (OUTPATIENT)
Dept: PHYSICAL MEDICINE AND REHAB | Facility: CLINIC | Age: 54
End: 2024-10-09
Payer: MEDICARE

## 2024-10-09 NOTE — TELEPHONE ENCOUNTER
[facility] Call Center    Phone Message    May a detailed message be left on voicemail: yes     Reason for Call: Other: Patients niece called and was wondering if the provider could order a sniff test. Please review.and call back.     Action Taken: Message routed to:  [facility] Med & Rehab    Travel Screening: Not Applicable     Date of Service:

## 2024-10-09 NOTE — TELEPHONE ENCOUNTER
Spoken with pt and his sister Sandy, regarding the need to sign XIOMARA to be signed so we have access to review Care everywhere from [facility] . Sandy and hanna aid they are about and about and will come to [facility] today to sign it in person. Rooming/EMT please let the  know to please put label on the form and  have the authorization form for XIOMARA ready for patient to sign, once singed please fax to [facility], and also fax to scan at [phone] so we have in scanned in pt's charts, and then let Dr Medina know asap, so she is able to review pt's chart form [place].  Thank you all so much, any questions please reach out to me by phone or staff message.

## 2024-10-09 NOTE — TELEPHONE ENCOUNTER
[facility] Call Center    Phone Message    May a detailed message be left on voicemail: yes     Reason for Call: Form or Letter   Type or form/letter needing completion: - letter stating to medicaid reason why pt is staying in the [place] for continued care / treatment.    Provider: Dr Medina  Date form needed: ASAP - time sensitive   Once completed: Fax form to: Medicaid - [phone]    Action Taken: Other: [facility] PM&R    Travel Screening: Not Applicable     Date of Service:

## 2024-10-10 ENCOUNTER — DOCUMENTATION ONLY (OUTPATIENT)
Dept: PHYSICAL MEDICINE AND REHAB | Facility: CLINIC | Age: 54
End: 2024-10-10
Payer: MEDICARE

## 2024-10-11 ENCOUNTER — TELEPHONE (OUTPATIENT)
Dept: NEUROLOGY | Facility: CLINIC | Age: 54
End: 2024-10-11
Payer: MEDICARE

## 2024-10-11 NOTE — TELEPHONE ENCOUNTER
Writer returned call to discuss requested order for sniff test, but informed Dr. Medina spoke with family yesterday. Rivka Carrion RN on 10/11/2024 at 10:00 AM

## 2024-10-11 NOTE — TELEPHONE ENCOUNTER
Health Call Center    Phone Message    May a detailed message be left on voicemail: yes     Reason for Call: Appointment Intake    Referring Provider Name: Kalyn Medina MD  Diagnosis and/or Symptoms: Intractable hiccups [R06.6    Please review and assist with scheduling, patient was referred to Dr. Patel for Cerebral Palsy/ Movement, conflicting diagnosis. Please call patient Marina Lang at [phone] to schedule appt.    Action Taken: Message routed to:  [facility] Neurology    Travel Screening: Not Applicable     Date of Service:

## 2024-10-23 NOTE — TELEPHONE ENCOUNTER
Records Requested     October 23, 2024 3:09 PM   [id number]   Facility  [facility]   Outcome 3:12 pm Sent request for imaging to be pushed to PACS.-SHREYAS Lea on 11/4/2024 at 4:13 PM Imaging resolved into PACS. -SHREYAS     RECORDS RECEIVED FROM: Care Everywhere   REASON FOR VISIT: VNS/Intractable hiccups   PROVIDER: Jason Fischer MD   DATE OF APPT: 12/3/24 @ 3:30 pm    NOTES (FOR ALL VISITS) STATUS DETAILS   OFFICE NOTE from referring provider Internal 10/8/24 [facility]     OFFICE NOTE from other specialist Care Everywhere 9/20/24 [facility]    8/6/24, 8/14/23 Collin Majano MD  @[facility]    7/8/24 Lyly Sher CNP  @[facility]     DISCHARGE REPORT from the ER Care Everywhere 7/23/24 Estrella Stephen MD  @[facility] ED     MEDICATION LIST Internal    IMAGING  (FOR ALL VISITS)     X-RAY PACS [facility]  8/21/24 XR Chest

## 2024-11-18 ENCOUNTER — HOSPITAL ENCOUNTER (EMERGENCY)
Dept: HOSPITAL 41 - JD.ED | Age: 54
LOS: 1 days | Discharge: HOME | End: 2024-11-19
Payer: MEDICARE

## 2024-11-18 DIAGNOSIS — Z79.899: ICD-10-CM

## 2024-11-18 DIAGNOSIS — R06.6: Primary | ICD-10-CM

## 2024-11-18 DIAGNOSIS — Z88.5: ICD-10-CM

## 2024-11-18 PROCEDURE — 96372 THER/PROPH/DIAG INJ SC/IM: CPT

## 2024-11-18 PROCEDURE — 96375 TX/PRO/DX INJ NEW DRUG ADDON: CPT

## 2024-11-18 PROCEDURE — 71045 X-RAY EXAM CHEST 1 VIEW: CPT

## 2024-11-18 PROCEDURE — 96374 THER/PROPH/DIAG INJ IV PUSH: CPT

## 2024-11-18 PROCEDURE — 99284 EMERGENCY DEPT VISIT MOD MDM: CPT

## 2024-11-18 PROCEDURE — 93005 ELECTROCARDIOGRAM TRACING: CPT

## 2024-11-18 RX ADMIN — ALUMINUM HYDROXIDE, MAGNESIUM HYDROXIDE, AND SIMETHICONE ONE ML: 200; 200; 20 SUSPENSION ORAL at 23:36

## 2024-11-18 RX ADMIN — LORAZEPAM ONE MG: 2 INJECTION INTRAMUSCULAR; INTRAVENOUS at 23:18

## 2024-11-18 RX ADMIN — DIPHENHYDRAMINE HYDROCHLORIDE ONE MG: 50 INJECTION, SOLUTION INTRAMUSCULAR; INTRAVENOUS at 23:18

## 2024-11-19 RX ADMIN — HYOSCYAMINE SULFATE ONE MG: 0.12 TABLET ORAL; SUBLINGUAL at 00:00

## 2024-11-21 NOTE — TELEPHONE ENCOUNTER
RECORDS RECEIVED FROM: Care Everywhere   REASON FOR VISIT: Intractable hiccups   PROVIDER: Jayce Calabrese MD   DATE OF APPT: 1/10/25 @ 11:00 am    NOTES (FOR ALL VISITS) STATUS DETAILS   OFFICE NOTE from referring provider Internal 10/8/24 [facility]      OFFICE NOTE from other specialist Care Everywhere 10/18/24 Llyy Sher CNP  @[facility]    9/20/24 [facility]     8/6/24, 8/14/23 Collin Majano MD  @[facility]     7/8/24 Lyly Sher CNP  @[facility]     DISCHARGE REPORT from the ER Care Everywhere 7/23/24 Estrella Stephen MD  @[facility] ED      MEDICATION LIST Internal    IMAGING  (FOR ALL VISITS)     X-RAY Received [facility]  8/21/24 XR Chest

## 2024-11-26 DIAGNOSIS — R06.6 INTRACTABLE HICCOUGHS: Primary | ICD-10-CM

## 2024-11-26 PROBLEM — K21.9 GASTROESOPHAGEAL REFLUX DISEASE: Status: ACTIVE | Noted: 2024-11-26

## 2024-11-26 PROBLEM — K22.70 BARRETT'S ESOPHAGUS WITHOUT DYSPLASIA: Status: ACTIVE | Noted: 2024-07-24

## 2024-11-26 PROBLEM — D50.9 IRON DEFICIENCY ANEMIA: Status: ACTIVE | Noted: 2022-08-10

## 2024-11-26 PROBLEM — E87.6 HYPOKALEMIA: Status: ACTIVE | Noted: 2024-07-24

## 2024-11-26 PROBLEM — G80.1 SPASTIC CEREBRAL PALSY (H): Status: ACTIVE | Noted: 2018-10-15

## 2024-11-26 RX ORDER — METOCLOPRAMIDE 10 MG/1
10 TABLET ORAL 3 TIMES DAILY PRN
COMMUNITY

## 2024-11-26 RX ORDER — METHOCARBAMOL 750 MG/1
1250 TABLET ORAL
COMMUNITY
Start: 2024-07-08

## 2024-11-26 RX ORDER — PANTOPRAZOLE SODIUM 40 MG/1
40 TABLET, DELAYED RELEASE ORAL EVERY MORNING
COMMUNITY

## 2024-11-26 RX ORDER — GABAPENTIN 300 MG/1
300 CAPSULE ORAL 3 TIMES DAILY
COMMUNITY
Start: 2024-03-12

## 2024-11-27 NOTE — PROGRESS NOTES
[address] [facility]   [facility] [address]  Mobile Phone  [phone]  Email  [email or web address]      Chlorpromazine thorazine 25mg   Cimetidine tagamet 200mg   D3 1.25mg 50,000 units  Esomeprazole nexium 40mg   Fluticasone propionate inhaler   Gabapentin neurontin 300mg   Metoclopramide reglan 10mg 3/day prn   Pantoprazole protonix 40mg          FISST: forced inspiratory suction and swallow tool; GERD: gastroesophageal reflux disease; IM: intramuscular; PPI: proton pump inhibitor.  * Causes of persistent hiccups include central nervous system disease, gastroesophageal disease (including reflux), post-operative phrenic nerve irritation, intrathoracic or neck mass, foreign body in the external ear canal, acute heart disease, intra-abdominal processes, emotional stress/excitement, metabolic derangements including kidney failure and electrolyte abnormalities, and medications (eg, dexamethasone, diazepam, midazolam, barbiturates, tramadol, alpha methyl dopa, and certain anticancer drugs such as levofolinate, fluorouracil, oxaliplatin, carboplatin, irinotecan).    Selection of PPI is based on patient and clinician preference, and standard GERD dosing is used. Refer to UpToDate content on management of GERD in adults.  ? Hypnotherapy and acupuncture may be used for treatment for persistent hiccups concurrent with pharmacotherapy, although evidence of efficacy is limited.  ? Most pharmacotherapies are initiated for 5 to 10 days. If hiccups subside, treatment can usually be stopped the day after cessation of hiccups. If not, we continue treatment for up to 15 days. If an agent is effective but hiccups recur after discontinuing it, longer-term usage of the agent may be needed. In some patients receiving palliative care, an indefinite duration of pharmacotherapy may be warranted.    For medications with dose ranges, we start at the lower dose and increase as needed.    Chlorpromazine dose can be increased to 50  mg orally 3 times daily, if needed. In older adults and in patients in whom adverse effects develop that may be dose related (eg, hypotension, drowsiness), we use 10 mg orally 3 times daily. For patients with refractory hiccups that continue despite 2 to 3 days of oral treatment, or in patients unable to tolerate oral medications, chlorpromazine may be administered via an IM route. If symptoms persist after one IM dose, chlorpromazine may be administered as a single IV dose with appropriate cautions to minimize hypotension (ie, adequate hydration, slow infusion, supine positioning).    Other treatment approaches for intractable hiccups include vagal nerve stimulation, phrenic nerve stimulation, phrenic nerve block, and positive pressure ventilation.       Huong Sharma M.D. - 09/20/2024 2:30 PM CDT  Formatting of this note might be different from the original.  SUBJECTIVE    Ordering Physician: Andreas Cruz M.D.  The patient was not personally interviewed or examined. The history and examination findings are based on the clinical documentation provided and/or discussed with a physician or provider who had personally interviewed and examined the patient. Time spent: 43 minutes of medical review in completing this eConsult.    Chief Complaint / Reason for Visit  A consult was placed regarding Roque Yang, a 54 y.o. male.    Clinical question to be answered: Chronic intractable hiccups,? Evaluation and management recommendations.        History of Present Illness  From review of records:  Patient was evaluated by Dr. Andreas Cruz in Neurology here in 2022. His medical history was significant for cerebral palsy with spastic weakness in the legs. He began having hiccups around 2010. When seen by Dr. Cruz then, he was having bouts approximately 2-3 times per day lasting up to an hour. He noted worsening in association with his GERD. He was not known to have other neurologic issues then aside from cerebral palsy.  Dr. Cruz at that time noted etiology to hiccups was unknown, and also recommended follow-up with the gastroenterology team for further evaluation of other medical causes, as well as recommend an MRI of the brain and MRA. If unrevealing, he recommended other symptomatic management.    In 2024, leading to request of this eConsult, he was continuing to reportedly have significant hiccups impacting quality of life and wanting to look into surgical options.    From review of outside records, he was admitted to the hospital in July of 2024, reportedly for acute on chronic hiccups which had interfered with oral intake. This was treated successfully reportedly with Reglan in the emergency department. They recommended follow-up with ENT. The patient and his family was inquiring in regard to possible surgical options.    Per review of previous notes, he had a several year history of GERD. He did have improvement in his hiccups with use of Prilosec in the past. He was also seen by Dr. Osiel Currie at [facility] in Neurology who suspected hiccups were gastroenterologic in origin, but did recommend an MRI of the brain and cervical spine to assess for a potential central etiology neurologically. If negative, he had recommended further evaluation with gastroenterology in ENT.    They had reportedly improved some on baclofen and pantoprazole in the past. Reportedly he had also been treated with chlorpromazine in the past, without improvement. He had also been on gabapentin, diazepam, lorazepam.    EGD in the past reportedly showed mucosal changes suggestive of long segment Bang's, hiatal hernia. I do note he was recently seen and gastroenterology, who recommended changing pantoprazole to esomeprazole.    Per records, reportedly an MRI/MRA of the brain in 2022 ruled out associated causes.    OBJECTIVE    ASSESSMENT / PLAN  #1 Hiccups  The etiology of hiccups is quite broad, and can be neurologic in origin, but often are  associated with other GI, pulmonary or other medical causes and commonly with GERD.    I am also in agreement with continued follow-up with Gastroenterology, who may perhaps have further recommendations for optimizing his GERD treatment to see if hiccups improve further given that there was some noted response with GERD treatment previously.    Additional medication options which in some have reported benefit, include Dilantin or valproic acid.    More invasive treatments which in some have been associated with benefit, include procedural interventions, however these would require further review and evaluation with a surgeon or pain/anesthesiology specialist who performs these, for further review and review of risks, and evaluation of baseline phrenic nerve function prior to consideration; this is not something we perform in Neurology, but he could be referred to a specialist who performs these for further review.    Beyond the previous testing already recommended, neurologically, I do not think any additional neurologic workup is necessary currently.    Electronically signed by Huong Sharma M.D. at 09/20/2024 3:26 PM CDT     Chana Lea    11/21/24 11:51 AM  Note          RECORDS RECEIVED FROM: Care Everywhere   REASON FOR VISIT: Intractable hiccups   PROVIDER: Jayce Calabrese MD   DATE OF APPT: 1/10/25 @ 11:00 am    NOTES (FOR ALL VISITS) STATUS DETAILS   OFFICE NOTE from referring provider Internal 10/8/24 Kalyn Medina MD @[facility]       OFFICE NOTE from other specialist Care Everywhere 10/18/24 Lyly Sher CNP  @[facility]     9/20/24 Huong Sh[facility] M.D.  [facility]     8/6/24, 8/14/23 Collin Majano MD  @[facility]     7/8/24 Lyly Sher CNP  @[facility]      DISCHARGE REPORT from the ER Care Everywhere 7/23/24 Estrella Stephen MD  @[facility]       MEDICATION LIST Internal     IMAGING  (FOR ALL VISITS)       X-RAY Received  Adi  8/21/24 XR Chest

## 2024-12-03 ENCOUNTER — TELEPHONE (OUTPATIENT)
Dept: NEUROLOGY | Facility: CLINIC | Age: 54
End: 2024-12-03

## 2024-12-03 ENCOUNTER — PRE VISIT (OUTPATIENT)
Dept: NEUROSURGERY | Facility: CLINIC | Age: 54
End: 2024-12-03

## 2024-12-03 ENCOUNTER — OFFICE VISIT (OUTPATIENT)
Dept: NEUROSURGERY | Facility: CLINIC | Age: 54
End: 2024-12-03
Attending: PHYSICAL MEDICINE & REHABILITATION
Payer: COMMERCIAL

## 2024-12-03 VITALS
SYSTOLIC BLOOD PRESSURE: 138 MMHG | DIASTOLIC BLOOD PRESSURE: 87 MMHG | HEART RATE: 103 BPM | OXYGEN SATURATION: 99 % | RESPIRATION RATE: 16 BRPM

## 2024-12-03 DIAGNOSIS — R06.6 INTRACTABLE HICCUPS: Primary | ICD-10-CM

## 2024-12-03 PROCEDURE — 99203 OFFICE O/P NEW LOW 30 MIN: CPT | Performed by: NEUROLOGICAL SURGERY

## 2024-12-03 NOTE — TELEPHONE ENCOUNTER
Spoke to Precious about expectations for the visit with Dr. Calabrese. There is nothing more he can offer that Roque has not tried however we can review those medication options again and physical maneuvers to help. Precious noted they may cancel the appointment depending on what Dr. Fischer says. Given we have nothing left to offer she is not sure its worth driving 7.5 hours to review what he has already done. She will review this with Roque and decide after seeing Dr. Fischer.

## 2024-12-03 NOTE — PROGRESS NOTES
Neurosurgery Clinic Note    Reason for Visit: VNS for intractable hiccups    History of Present Illness  Roque Yang is a 54-year-old male with a 14-year history of debilitating intractable hiccups, profoundly impairing his quality of life. His symptoms occur approximately 90% of each day, severely limiting his ability to eat, drink, and sleep. He experiences frequent nausea and abdominal discomfort associated with his hiccups, which have progressively worsened in frequency and intensity over the years. Despite extensive evaluations and a broad range of therapeutic interventions, his hiccups remain refractory to treatment.    Roque has undergone a comprehensive evaluation for potential underlying causes. He was diagnosed with Bang s esophagus with a 3 cm hiatal hernia during a 2023 EGD. This also demonstrated long-segment Bang s esophagus classified as C5-M6 per [place] criteria, without dysplasia, and no significant findings in the stomach or duodenum. His GERD is managed on esomeprazole (Nexium) 40 mg twice daily, and he adheres to antireflux measures. He denies nocturnal reflux, dysphagia, odynophagia, or other alarm symptoms but does report awakening at night due to his hiccups. Additional workup includes MRI and MRA of the brain, which were unremarkable, ruling out central nervous system lesions such as AVMs, tumors, or aneurysms. An attempt at esophageal manometry at [facility] was unsuccessful due to intolerance of the procedure. Colonoscopy identified tubular adenomas and diverticulosis, and he is due for repeat surveillance in 2026. 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    Pharmacologic interventions have been extensive and include chlorpromazine, baclofen, cimetidine, gabapentin, metoclopramide (Reglan), lorazepam, and other agents. He  reports only modest improvement in symptom severity with thorazine (25 mg), which he takes at night to improve sleep, resulting in 2-3 hours of rest before the hiccups resume. He has also noted transient improvement with carbonated beverages but no sustained relief. Several medications were discontinued due to ineffectiveness or side effects. Trials of gabapentin, baclofen, and reglan at maximum dosages failed to alleviate symptoms. Roque has significant comorbidities, including spastic diplegic cerebral palsy, with associated bilateral lower extremity contractures, clonus, and significant muscle atrophy. His functional limitations necessitate the use of a wheelchair, though his spasticity has not required formal interventions beyond medications. He lives with his mother in [place] and receives daily care from his niece, who recently left her job to provide full-time support.    Socially, his condition has resulted in increasing isolation and significant psychological distress. Roque has expressed feelings of hopelessness and frustration, compounded by the chronic nature of his symptoms and the lack of effective treatment options. His niece and sister have raised ethical concerns about his declining health, including his inability to maintain proper nutrition, hydration, or sleep, which has led to significant weight loss. They are highly motivated to pursue further evaluation and treatment, even experimental or surgical interventions, and have already consulted with specialists at the [facility]. They report that he is scheduled for a neurosurgical consultation regarding vagus nerve stimulation (VNS) as a potential option. Roque s history highlights the complexity of his condition and the extensive but ultimately ineffective diagnostic and therapeutic efforts to date. His family is seeking immediate intervention to address his intractable hiccups, recognizing the experimental nature of some proposed  treatments, and they are willing to explore all options, including surgery.    Past Medical History        Cerebral Palsy (Spastic Diplegic): Associated with bilateral lower extremity spasticity, muscle contractures, and limited mobility.      Bang s Esophagus: Diagnosed in 2023 with a 3 cm hiatal hernia; managed with esomeprazole (Nexium).      GERD: Well-controlled on current PPI regimen without alarm symptoms.      Intractable Hiccups: Persistent for 14 years, refractory to medical management and associated with significant morbidity.      Iron Deficiency Anemia: Resolved following previous evaluations and management.    Past Surgical History        Appendectomy      Hip surgery with adductor lengthening (specific details not available).      EGD with biopsy and colonoscopy with polypectomy (2023).    Social History        Lives with his mother in [place].      Requires full-time care from his niece.      No current tobacco, alcohol, or drug use.      Significant social isolation and psychological distress due to his condition.    Family History        Father: Hypertension, hyperlipidemia, diabetes, prostate cancer.      Mother: Hypertension.      Brother: No known medical issues.       Allergies   Allergen Reactions    Aspirin Hives and Rash       Current Outpatient Medications   Medication Sig Dispense Refill    chlorproMAZINE (THORAZINE) 25 MG tablet Take 25 mg by mouth 2 times daily.      cimetidine (TAGAMET) 200 MG tablet Take 200 mg by mouth 2 times daily.      D3-50 1.25 MG (33660 UT) capsule Take 1,250 mcg by mouth every 7 days.      esomeprazole (NEXIUM) 40 MG DR capsule Take 40 mg by mouth.      FLUTICASONE PROPIONATE, INHAL, IN Inhale into the lungs.      metoclopramide (REGLAN) 10 MG tablet Take 10 mg by mouth 3 times daily as needed.      gabapentin (NEURONTIN) 300 MG capsule Take 300 mg by mouth 3 times daily.      pantoprazole (PROTONIX) 40 MG EC tablet Take 40 mg by mouth every morning.        No current facility-administered medications for this visit.             Physical Exam  /87   Pulse 103   Resp 16   SpO2 99%       General: Awake, alert, oriented. Well nourished, well developed, mild distress  HEENT: Head normocephalic, atraumatic. Miguel present. No scars or incisions on neck or chest  Extremity: Warm with no clubbing or cyanosis. No lower extremity edema.    Neurological  Awake, alert and oriented to date, time, place and person. Speech fluent and consistent with cerebral palsy  Pupils equal, round, reactive to light.  Extraocular movement intact.  Face symmetric.  Tongue midline.  Uvula elevates equally.    Motor: full strength throughout in BUE, R>L in lower extremities. Spasticity with clonus and contractures    Assessment and Plan   Roque Yang is a 54-year-old male with a 14-year history of intractable hiccups, presenting significant challenges to his quality of life and overall health. His condition is associated with Bang's esophagus, GERD, and spastic diplegic cerebral palsy. Despite extensive medical management, including trials of chlorpromazine, metoclopramide, gabapentin, baclofen, and lorazepam, his symptoms remain refractory. Multiple evaluations, including endoscopy, colonoscopy, and imaging studies, have ruled out structural or central nervous system causes. Additionally, his condition has not responded to proton pump inhibitors (PPIs), such as esomeprazole, which successfully manage his GERD but have not alleviated his hiccup symptoms. Attempts to explore alternative diagnostic and therapeutic options, such as esophageal manometry and swallow therapy, were unsuccessful due to procedural intolerance and lack of efficacy. The chronicity and severity of his hiccups have caused profound disruptions to his life, including persistent sleep deprivation, unintentional weight loss, social isolation, and psychological distress. Current management strategies, including  Thorazine at bedtime, provide temporary relief of his symptoms but are insufficient to restore functional quality of life. His history of spastic cerebral palsy further complicates his condition, contributing to muscle spasticity and limited mobility, which exacerbate his overall disability. Although he has pursued multiple avenues of care through gastroenterology, neurology, and primary care, these efforts have not identified any viable alternative treatments.    Given the intractability of his symptoms and the failure of medical management, the use of vagus nerve stimulation (VNS) represents a reasonable and evidence-supported intervention. While VNS is not FDA-approved for intractable hiccups, there is precedent in the medical literature for its use in similar conditions involving autonomic and central nervous system dysfunction. Reports indicate that VNS has shown efficacy in refractory cases of chronic hiccups, including case studies describing marked symptom improvement when traditional therapies have failed (1, 2). The physiological basis for VNS lies in its ability to modulate brainstem circuits, including the nucleus tractus solitarius and central pattern generators, which are implicated in the control of hiccup reflex arcs. This mechanism provides a rational basis for considering VNS in patients like Mr. Yang, who lack other therapeutic options.    Given the significant burden of his condition, the ethical imperative to alleviate suffering, and the absence of viable alternatives, the proposed intervention aligns with the principle of providing compassionate, evidence-informed care. Further supporting the argument for VNS is the fact that his condition has reached a point where he faces severe health consequences, including malnutrition. The safety profile of VNS is well-documented in its approved indications, such as epilepsy and depression, with a relatively low risk of serious adverse events.  Therefore, the balance of potential benefit versus risk strongly favors proceeding with this intervention.    We discussed at length the alternatives, potential benefits, and potentials risks of VNS therapy. We broke the risks down into procedural risks, risks of general anesthesia, and risks of stimulation. We specifically discussed the risk of typical and atypical pain, hemorrhage, postoperative hemorrhage and hematoma including the need for surgical evacuation, infection including serious infection of the neck or chest, need for further surgeries, vocal cord palsy or paralysis, damage to cranial nerves, damage to the carotid artery with or without stroke, damage to the jugular veins, MI, risks of anesthesia, hoarseness, vocal cord dysfunction, sensation or pain around the anchors and non efficacy. We extensively discussed that this procedure is off label.     We will collaborate with his primary care physician, gastroenterologist, and neurologist to document his refractory state comprehensively, ensuring a robust case for insurance approval. Additionally, we will prepare for the post-operative requirements of VNS, including titration and device programming, to maximize therapeutic outcomes.    Preop clearance  Left-sided vagal nerve stimulator placement      Jason Fischer MD  Department of Neurosurgery  [facility]

## 2024-12-03 NOTE — LETTER
12/3/2024       RE: Roque Yang  [address]  [place] [address]     Dear Colleague,    Thank you for referring your patient, Roque Yang, to the [facility] [place] [facility]. Please see a copy of my visit note below.    Neurosurgery Clinic Note    Reason for Visit: VNS for intractable hiccups    History of Present Illness  Roque Yang is a 54-year-old male with a 14-year history of debilitating intractable hiccups, profoundly impairing his quality of life. His symptoms occur approximately 90% of each day, severely limiting his ability to eat, drink, and sleep. He experiences frequent nausea and abdominal discomfort associated with his hiccups, which have progressively worsened in frequency and intensity over the years. Despite extensive evaluations and a broad range of therapeutic interventions, his hiccups remain refractory to treatment.    Roque has undergone a comprehensive evaluation for potential underlying causes. He was diagnosed with Bang s esophagus with a 3 cm hiatal hernia during a 2023 EGD. This also demonstrated long-segment Bang s esophagus classified as C5-M6 per [place] criteria, without dysplasia, and no significant findings in the stomach or duodenum. His GERD is managed on esomeprazole (Nexium) 40 mg twice daily, and he adheres to antireflux measures. He denies nocturnal reflux, dysphagia, odynophagia, or other alarm symptoms but does report awakening at night due to his hiccups. Additional workup includes MRI and MRA of the brain, which were unremarkable, ruling out central nervous system lesions such as AVMs, tumors, or aneurysms. An attempt at esophageal manometry at [facility] was unsuccessful due to intolerance of the procedure. Colonoscopy identified tubular adenomas and diverticulosis, and he is due for repeat surveillance in 2026. ENT evaluation identified a posterior vocal  cord lesion, for which surgical intervention was considered, and neurology consultations have ruled out significant neurological causes for his symptoms.    Pharmacologic interventions have been extensive and include chlorpromazine, baclofen, cimetidine, gabapentin, metoclopramide (Reglan), lorazepam, and other agents. He reports only modest improvement in symptom severity with thorazine (25 mg), which he takes at night to improve sleep, resulting in 2-3 hours of rest before the hiccups resume. He has also noted transient improvement with carbonated beverages but no sustained relief. Several medications were discontinued due to ineffectiveness or side effects. Trials of gabapentin, baclofen, and reglan at maximum dosages failed to alleviate symptoms. Roque has significant comorbidities, including spastic diplegic cerebral palsy, with associated bilateral lower extremity contractures, clonus, and significant muscle atrophy. His functional limitations necessitate the use of a wheelchair, though his spasticity has not required formal interventions beyond medications. He lives with his mother in [place] and receives daily care from his niece, who recently left her job to provide full-time support.    Socially, his condition has resulted in increasing isolation and significant psychological distress. Roque has expressed feelings of hopelessness and frustration, compounded by the chronic nature of his symptoms and the lack of effective treatment options. His niece and sister have raised ethical concerns about his declining health, including his inability to maintain proper nutrition, hydration, or sleep, which has led to significant weight loss. They are highly motivated to pursue further evaluation and treatment, even experimental or surgical interventions, and have already consulted with specialists at the [facility]. They report that he is scheduled for a neurosurgical consultation regarding vagus nerve  stimulation (VNS) as a potential option. Roque s history highlights the complexity of his condition and the extensive but ultimately ineffective diagnostic and therapeutic efforts to date. His family is seeking immediate intervention to address his intractable hiccups, recognizing the experimental nature of some proposed treatments, and they are willing to explore all options, including surgery.    Past Medical History        Cerebral Palsy (Spastic Diplegic): Associated with bilateral lower extremity spasticity, muscle contractures, and limited mobility.      Bang s Esophagus: Diagnosed in 2023 with a 3 cm hiatal hernia; managed with esomeprazole (Nexium).      GERD: Well-controlled on current PPI regimen without alarm symptoms.      Intractable Hiccups: Persistent for 14 years, refractory to medical management and associated with significant morbidity.      Iron Deficiency Anemia: Resolved following previous evaluations and management.    Past Surgical History        Appendectomy      Hip surgery with adductor lengthening (specific details not available).      EGD with biopsy and colonoscopy with polypectomy (2023).    Social History        Lives with his mother in [place].      Requires full-time care from his niece.      No current tobacco, alcohol, or drug use.      Significant social isolation and psychological distress due to his condition.    Family History        Father: Hypertension, hyperlipidemia, diabetes, prostate cancer.      Mother: Hypertension.      Brother: No known medical issues.       Allergies   Allergen Reactions     Aspirin Hives and Rash       Current Outpatient Medications   Medication Sig Dispense Refill     chlorproMAZINE (THORAZINE) 25 MG tablet Take 25 mg by mouth 2 times daily.       cimetidine (TAGAMET) 200 MG tablet Take 200 mg by mouth 2 times daily.       D3-50 1.25 MG (48216 UT) capsule Take 1,250 mcg by mouth every 7 days.       esomeprazole (NEXIUM) 40 MG DR capsule Take  40 mg by mouth.       FLUTICASONE PROPIONATE, INHAL, IN Inhale into the lungs.       metoclopramide (REGLAN) 10 MG tablet Take 10 mg by mouth 3 times daily as needed.       gabapentin (NEURONTIN) 300 MG capsule Take 300 mg by mouth 3 times daily.       pantoprazole (PROTONIX) 40 MG EC tablet Take 40 mg by mouth every morning.       No current facility-administered medications for this visit.             Physical Exam  /87   Pulse 103   Resp 16   SpO2 99%       General: Awake, alert, oriented. Well nourished, well developed, mild distress  HEENT: Head normocephalic, atraumatic. Miguel present. No scars or incisions on neck or chest  Extremity: Warm with no clubbing or cyanosis. No lower extremity edema.    Neurological  Awake, alert and oriented to date, time, place and person. Speech fluent and consistent with cerebral palsy  Pupils equal, round, reactive to light.  Extraocular movement intact.  Face symmetric.  Tongue midline.  Uvula elevates equally.    Motor: full strength throughout in BUE, R>L in lower extremities. Spasticity with clonus and contractures    Assessment and Plan   Roque Yang is a 54-year-old male with a 14-year history of intractable hiccups, presenting significant challenges to his quality of life and overall health. His condition is associated with Bang's esophagus, GERD, and spastic diplegic cerebral palsy. Despite extensive medical management, including trials of chlorpromazine, metoclopramide, gabapentin, baclofen, and lorazepam, his symptoms remain refractory. Multiple evaluations, including endoscopy, colonoscopy, and imaging studies, have ruled out structural or central nervous system causes. Additionally, his condition has not responded to proton pump inhibitors (PPIs), such as esomeprazole, which successfully manage his GERD but have not alleviated his hiccup symptoms. Attempts to explore alternative diagnostic and therapeutic options, such as esophageal manometry and  swallow therapy, were unsuccessful due to procedural intolerance and lack of efficacy. The chronicity and severity of his hiccups have caused profound disruptions to his life, including persistent sleep deprivation, unintentional weight loss, social isolation, and psychological distress. Current management strategies, including Thorazine at bedtime, provide temporary relief of his symptoms but are insufficient to restore functional quality of life. His history of spastic cerebral palsy further complicates his condition, contributing to muscle spasticity and limited mobility, which exacerbate his overall disability. Although he has pursued multiple avenues of care through gastroenterology, neurology, and primary care, these efforts have not identified any viable alternative treatments.    Given the intractability of his symptoms and the failure of medical management, the use of vagus nerve stimulation (VNS) represents a reasonable and evidence-supported intervention. While VNS is not FDA-approved for intractable hiccups, there is precedent in the medical literature for its use in similar conditions involving autonomic and central nervous system dysfunction. Reports indicate that VNS has shown efficacy in refractory cases of chronic hiccups, including case studies describing marked symptom improvement when traditional therapies have failed (1, 2). The physiological basis for VNS lies in its ability to modulate brainstem circuits, including the nucleus tractus solitarius and central pattern generators, which are implicated in the control of hiccup reflex arcs. This mechanism provides a rational basis for considering VNS in patients like Mr. Yang, who lack other therapeutic options.    Given the significant burden of his condition, the ethical imperative to alleviate suffering, and the absence of viable alternatives, the proposed intervention aligns with the principle of providing compassionate, evidence-informed  care. Further supporting the argument for VNS is the fact that his condition has reached a point where he faces severe health consequences, including malnutrition. The safety profile of VNS is well-documented in its approved indications, such as epilepsy and depression, with a relatively low risk of serious adverse events. Therefore, the balance of potential benefit versus risk strongly favors proceeding with this intervention.    We discussed at length the alternatives, potential benefits, and potentials risks of VNS therapy. We broke the risks down into procedural risks, risks of general anesthesia, and risks of stimulation. We specifically discussed the risk of typical and atypical pain, hemorrhage, postoperative hemorrhage and hematoma including the need for surgical evacuation, infection including serious infection of the neck or chest, need for further surgeries, vocal cord palsy or paralysis, damage to cranial nerves, damage to the carotid artery with or without stroke, damage to the jugular veins, MI, risks of anesthesia, hoarseness, vocal cord dysfunction, sensation or pain around the anchors and non efficacy. We extensively discussed that this procedure is off label.     We will collaborate with his primary care physician, gastroenterologist, and neurologist to document his refractory state comprehensively, ensuring a robust case for insurance approval. Additionally, we will prepare for the post-operative requirements of VNS, including titration and device programming, to maximize therapeutic outcomes.    Preop clearance  Left-sided vagal nerve stimulator placement      Jason Fischer MD  Department of Neurosurgery  [facility]      Again, thank you for allowing me to participate in the care of your patient.      Sincerely,    Jason Fischer MD

## 2024-12-12 ENCOUNTER — HOSPITAL ENCOUNTER (EMERGENCY)
Dept: HOSPITAL 41 - JD.ED | Age: 54
LOS: 1 days | Discharge: HOME | End: 2024-12-13
Payer: MEDICARE

## 2024-12-12 DIAGNOSIS — R06.6: ICD-10-CM

## 2024-12-12 DIAGNOSIS — Z88.6: ICD-10-CM

## 2024-12-12 DIAGNOSIS — G80.1: Primary | ICD-10-CM

## 2024-12-12 LAB
ALBUMIN SERPL-MCNC: 4.3 G/DL (ref 3.4–5)
ALBUMIN/GLOB SERPL: 1.1 {RATIO} (ref 1–2)
ALP SERPL-CCNC: 91 U/L (ref 46–116)
ALT SERPL-CCNC: 21 U/L (ref 16–63)
ANION GAP SERPL CALC-SCNC: 17.3 MMOL/L (ref 5–15)
AST SERPL-CCNC: 12 U/L (ref 15–37)
BASOPHILS # BLD AUTO: 0.1 K/MM3 (ref 0–0.2)
BASOPHILS NFR BLD AUTO: 0.5 % (ref 0–1)
BILIRUB SERPL-MCNC: 0.6 MG/DL (ref 0.2–1)
BUN SERPL-MCNC: 15 MG/DL (ref 7–18)
BUN/CREAT SERPL: 15 (ref 14–18)
CALCIUM SERPL-MCNC: 9 MG/DL (ref 8.5–10.1)
CHLORIDE SERPL-SCNC: 98 MEQ/L (ref 98–107)
CO2 SERPL-SCNC: 24 MEQ/L (ref 21–32)
CREAT CL 24H UR+SERPL-VRATE: 74.43 ML/MIN
CREAT SERPL-MCNC: 1 MG/DL (ref 0.7–1.3)
EGFRCR SERPLBLD CKD-EPI 2021: 88 ML/MIN (ref 60–?)
EOSINOPHIL # BLD AUTO: 0.1 K/MM3 (ref 0–0.4)
EOSINOPHIL NFR BLD AUTO: 0.8 % (ref 0–6)
GLOBULIN SER-MCNC: 3.8 GM/DL
GLUCOSE SERPL-MCNC: 120 MG/DL (ref 70–99)
HCT VFR BLD AUTO: 47.1 % (ref 42–52)
HGB BLD-MCNC: 17 GM/DL (ref 14–18)
IMM GRANULOCYTES # BLD: 0.02 K/MM3 (ref 0–0.05)
IMM GRANULOCYTES NFR BLD: 0.2 % (ref 0–0.4)
LYMPHOCYTES # BLD AUTO: 1.5 K/MM3 (ref 1–4.8)
LYMPHOCYTES NFR BLD AUTO: 15.9 % (ref 24–44)
MAGNESIUM SERPL-MCNC: 1.9 MG/DL (ref 1.8–2.4)
MCH RBC QN AUTO: 29.9 PG (ref 28–32)
MCHC RBC AUTO-ENTMCNC: 36.1 G/DL (ref 32–36)
MCHC RBC AUTO-ENTMCNC: 82.9 FL (ref 83–99)
MONOCYTES # BLD AUTO: 0.8 K/MM3 (ref 0–0.8)
MONOCYTES NFR BLD AUTO: 7.9 % (ref 0–8)
NEUTROPHILS # BLD AUTO: 7.1 K/MM3 (ref 1.8–7.7)
NEUTROPHILS NFR BLD AUTO: 74.7 % (ref 41–71)
NRBC BLD AUTO-RTO: 0 % (ref 0–0.02)
NRBC BLD AUTO-RTO: 0 % (ref 0–0.2)
PLATELET # BLD AUTO: 445 K/MM3 (ref 150–400)
PMV BLD AUTO: 8.2 FL (ref 9.4–12.4)
POTASSIUM SERPL-SCNC: 3.3 MEQ/L (ref 3.5–5.1)
PROT SERPL-MCNC: 8.1 G/DL (ref 6.4–8.2)
RBC # BLD AUTO: 5.68 M/MM3 (ref 4.52–5.9)
SODIUM SERPL-SCNC: 136 MEQ/L (ref 136–145)
WBC # BLD AUTO: 9.57 K/MM3 (ref 3.9–11.3)

## 2024-12-12 PROCEDURE — 85025 COMPLETE CBC W/AUTO DIFF WBC: CPT

## 2024-12-12 PROCEDURE — 83735 ASSAY OF MAGNESIUM: CPT

## 2024-12-12 PROCEDURE — 93005 ELECTROCARDIOGRAM TRACING: CPT

## 2024-12-12 PROCEDURE — 96375 TX/PRO/DX INJ NEW DRUG ADDON: CPT

## 2024-12-12 PROCEDURE — 96365 THER/PROPH/DIAG IV INF INIT: CPT

## 2024-12-12 PROCEDURE — 80053 COMPREHEN METABOLIC PANEL: CPT

## 2024-12-12 PROCEDURE — 96361 HYDRATE IV INFUSION ADD-ON: CPT

## 2024-12-12 PROCEDURE — 36415 COLL VENOUS BLD VENIPUNCTURE: CPT

## 2024-12-12 PROCEDURE — 96366 THER/PROPH/DIAG IV INF ADDON: CPT

## 2024-12-12 PROCEDURE — 99285 EMERGENCY DEPT VISIT HI MDM: CPT

## 2024-12-12 PROCEDURE — 96376 TX/PRO/DX INJ SAME DRUG ADON: CPT

## 2024-12-12 RX ADMIN — DIPHENHYDRAMINE HYDROCHLORIDE ONE MG: 50 INJECTION, SOLUTION INTRAMUSCULAR; INTRAVENOUS at 19:47

## 2024-12-12 RX ADMIN — LORAZEPAM ONE MG: 2 INJECTION INTRAMUSCULAR; INTRAVENOUS at 19:47

## 2024-12-13 RX ADMIN — DIPHENHYDRAMINE HYDROCHLORIDE ONE MG: 50 INJECTION, SOLUTION INTRAMUSCULAR; INTRAVENOUS at 01:49

## 2024-12-15 ENCOUNTER — HEALTH MAINTENANCE LETTER (OUTPATIENT)
Age: 54
End: 2024-12-15

## 2024-12-17 ENCOUNTER — HOSPITAL ENCOUNTER (EMERGENCY)
Dept: HOSPITAL 41 - JD.ED | Age: 54
LOS: 1 days | Discharge: HOME | End: 2024-12-18
Payer: MEDICARE

## 2024-12-17 ENCOUNTER — TELEPHONE (OUTPATIENT)
Dept: NEUROSURGERY | Facility: CLINIC | Age: 54
End: 2024-12-17
Payer: COMMERCIAL

## 2024-12-17 DIAGNOSIS — Z88.6: ICD-10-CM

## 2024-12-17 DIAGNOSIS — Z79.899: ICD-10-CM

## 2024-12-17 DIAGNOSIS — R06.6: Primary | ICD-10-CM

## 2024-12-17 LAB
ALBUMIN SERPL-MCNC: 3.9 G/DL (ref 3.4–5)
ALBUMIN/GLOB SERPL: 1.1 {RATIO} (ref 1–2)
ALP SERPL-CCNC: 84 U/L (ref 46–116)
ALT SERPL-CCNC: 19 U/L (ref 16–63)
ANION GAP SERPL CALC-SCNC: 11.5 MMOL/L (ref 5–15)
AST SERPL-CCNC: 16 U/L (ref 15–37)
BASOPHILS # BLD AUTO: 0.1 K/MM3 (ref 0–0.2)
BASOPHILS NFR BLD AUTO: 0.8 % (ref 0–1)
BILIRUB SERPL-MCNC: 0.6 MG/DL (ref 0.2–1)
BUN SERPL-MCNC: 12 MG/DL (ref 7–18)
BUN/CREAT SERPL: 12 (ref 14–18)
CALCIUM SERPL-MCNC: 8.8 MG/DL (ref 8.5–10.1)
CHLORIDE SERPL-SCNC: 100 MEQ/L (ref 98–107)
CO2 SERPL-SCNC: 28 MEQ/L (ref 21–32)
CREAT CL 24H UR+SERPL-VRATE: 60.86 ML/MIN
CREAT SERPL-MCNC: 1 MG/DL (ref 0.7–1.3)
EGFRCR SERPLBLD CKD-EPI 2021: 88 ML/MIN (ref 60–?)
EOSINOPHIL # BLD AUTO: 0.1 K/MM3 (ref 0–0.4)
EOSINOPHIL NFR BLD AUTO: 1.2 % (ref 0–6)
GLOBULIN SER-MCNC: 3.7 GM/DL
GLUCOSE SERPL-MCNC: 104 MG/DL (ref 70–99)
HCT VFR BLD AUTO: 49.6 % (ref 42–52)
HGB BLD-MCNC: 16 GM/DL (ref 14–18)
IMM GRANULOCYTES # BLD: 0.02 K/MM3 (ref 0–0.05)
IMM GRANULOCYTES NFR BLD: 0.2 % (ref 0–0.4)
LIPASE SERPL-CCNC: 50 U/L (ref 16–77)
LYMPHOCYTES # BLD AUTO: 1.5 K/MM3 (ref 1–4.8)
LYMPHOCYTES NFR BLD AUTO: 16.5 % (ref 24–44)
MCH RBC QN AUTO: 30 PG (ref 28–32)
MCHC RBC AUTO-ENTMCNC: 32.3 G/DL (ref 32–36)
MCHC RBC AUTO-ENTMCNC: 93.1 FL (ref 83–99)
MONOCYTES # BLD AUTO: 0.7 K/MM3 (ref 0–0.8)
MONOCYTES NFR BLD AUTO: 7.1 % (ref 0–8)
NEUTROPHILS # BLD AUTO: 6.8 K/MM3 (ref 1.8–7.7)
NEUTROPHILS NFR BLD AUTO: 74.2 % (ref 41–71)
NRBC BLD AUTO-RTO: 0 % (ref 0–0.02)
NRBC BLD AUTO-RTO: 0 % (ref 0–0.2)
PLATELET # BLD AUTO: 397 K/MM3 (ref 150–400)
PMV BLD AUTO: 9.1 FL (ref 9.4–12.4)
POTASSIUM SERPL-SCNC: 3.5 MEQ/L (ref 3.5–5.1)
PROT SERPL-MCNC: 7.6 G/DL (ref 6.4–8.2)
RBC # BLD AUTO: 5.33 M/MM3 (ref 4.52–5.9)
SODIUM SERPL-SCNC: 136 MEQ/L (ref 136–145)
WBC # BLD AUTO: 9.13 K/MM3 (ref 3.9–11.3)

## 2024-12-17 PROCEDURE — 99283 EMERGENCY DEPT VISIT LOW MDM: CPT

## 2024-12-17 PROCEDURE — 96375 TX/PRO/DX INJ NEW DRUG ADDON: CPT

## 2024-12-17 PROCEDURE — 83690 ASSAY OF LIPASE: CPT

## 2024-12-17 PROCEDURE — 36415 COLL VENOUS BLD VENIPUNCTURE: CPT

## 2024-12-17 PROCEDURE — 96374 THER/PROPH/DIAG INJ IV PUSH: CPT

## 2024-12-17 PROCEDURE — 80053 COMPREHEN METABOLIC PANEL: CPT

## 2024-12-17 PROCEDURE — 85025 COMPLETE CBC W/AUTO DIFF WBC: CPT

## 2024-12-17 RX ADMIN — DIPHENHYDRAMINE HYDROCHLORIDE ONE MG: 50 INJECTION, SOLUTION INTRAMUSCULAR; INTRAVENOUS at 21:16

## 2024-12-17 NOTE — TELEPHONE ENCOUNTER
Called patient to schedule surgery with Dr. Jason Fischer    Spoke with:  Precious (patients niece)    Date of Surgery: 1/29/2025    Estimated Arrival time Discussed with Patient:  No    Location of surgery: [facility]     Pre-Op H&P: Complete with PCP    Post-Op Appts: 2/14/2025 with Carri Roland NP     Imaging:  No      Discussed with patient pre-op RN will call 2-3 days prior to surgery with arrival time and instructions:  Yes     Packet sent out: No 12/17/24  via [employer]    Vendor/Rep/Monitoring:   Yes via [employer]    Additional Comments:      All patients questions were answered and patient was instructed to review surgical packet and call back with any questions or concerns.       Cande Pineda on 12/17/2024 at 9:52 AM

## 2025-01-10 ENCOUNTER — PRE VISIT (OUTPATIENT)
Dept: NEUROLOGY | Facility: CLINIC | Age: 55
End: 2025-01-10

## 2025-01-13 ENCOUNTER — HOSPITAL ENCOUNTER (EMERGENCY)
Dept: HOSPITAL 41 - JD.ED | Age: 55
Discharge: HOME | End: 2025-01-13
Payer: MEDICAID

## 2025-01-13 DIAGNOSIS — Z79.899: ICD-10-CM

## 2025-01-13 DIAGNOSIS — Z88.8: ICD-10-CM

## 2025-01-13 DIAGNOSIS — R06.6: Primary | ICD-10-CM

## 2025-01-13 DIAGNOSIS — Z88.6: ICD-10-CM

## 2025-01-13 LAB
ALBUMIN SERPL-MCNC: 4 G/DL (ref 3.4–5)
ALBUMIN/GLOB SERPL: 1.1 {RATIO} (ref 1–2)
ALP SERPL-CCNC: 86 U/L (ref 46–116)
ALT SERPL-CCNC: 18 U/L (ref 16–63)
ANION GAP SERPL CALC-SCNC: 15.8 MMOL/L (ref 5–15)
APPEARANCE UR: CLEAR
AST SERPL-CCNC: 15 U/L (ref 15–37)
BASOPHILS # BLD AUTO: 0.1 K/MM3 (ref 0–0.2)
BASOPHILS NFR BLD AUTO: 0.4 % (ref 0–1)
BILIRUB SERPL-MCNC: 0.8 MG/DL (ref 0.2–1)
BILIRUB UR STRIP-MCNC: NEGATIVE MG/DL
BUN SERPL-MCNC: 8 MG/DL (ref 7–18)
BUN/CREAT SERPL: 8 (ref 14–18)
CALCIUM SERPL-MCNC: 8.9 MG/DL (ref 8.5–10.1)
CHLORIDE SERPL-SCNC: 98 MEQ/L (ref 98–107)
CO2 SERPL-SCNC: 28 MEQ/L (ref 21–32)
COLOR UR: YELLOW
CREAT CL 24H UR+SERPL-VRATE: (no result) ML/MIN
CREAT SERPL-MCNC: 1 MG/DL (ref 0.7–1.3)
EGFRCR SERPLBLD CKD-EPI 2021: 88 ML/MIN (ref 60–?)
EOSINOPHIL # BLD AUTO: 0 K/MM3 (ref 0–0.4)
EOSINOPHIL NFR BLD AUTO: 0.1 % (ref 0–6)
GLOBULIN SER-MCNC: 3.7 GM/DL
GLUCOSE SERPL-MCNC: 103 MG/DL (ref 70–99)
GLUCOSE UR STRIP-MCNC: NEGATIVE MG/DL
HCT VFR BLD AUTO: 52.9 % (ref 42–52)
HGB BLD-MCNC: 17.6 GM/DL (ref 14–18)
IMM GRANULOCYTES # BLD: 0.05 K/MM3 (ref 0–0.05)
IMM GRANULOCYTES NFR BLD: 0.4 % (ref 0–0.4)
KETONES UR STRIP-MCNC: NEGATIVE MG/DL
LYMPHOCYTES # BLD AUTO: 1 K/MM3 (ref 1–4.8)
LYMPHOCYTES NFR BLD AUTO: 7.1 % (ref 24–44)
MCH RBC QN AUTO: 29.3 PG (ref 28–32)
MCHC RBC AUTO-ENTMCNC: 33.3 G/DL (ref 32–36)
MCHC RBC AUTO-ENTMCNC: 88.2 FL (ref 83–99)
MONOCYTES # BLD AUTO: 0.6 K/MM3 (ref 0–0.8)
MONOCYTES NFR BLD AUTO: 4.5 % (ref 0–8)
NEUTROPHILS # BLD AUTO: 12.4 K/MM3 (ref 1.8–7.7)
NEUTROPHILS NFR BLD AUTO: 87.5 % (ref 41–71)
NITRITE UR QL: NEGATIVE
NRBC BLD AUTO-RTO: 0 % (ref 0–0.02)
NRBC BLD AUTO-RTO: 0 % (ref 0–0.2)
PH UR STRIP: 6 [PH] (ref 5–8)
PLATELET # BLD AUTO: 427 K/MM3 (ref 150–400)
PMV BLD AUTO: 8.5 FL (ref 9.4–12.4)
POTASSIUM SERPL-SCNC: 3.8 MEQ/L (ref 3.5–5.1)
PROT SERPL-MCNC: 7.7 G/DL (ref 6.4–8.2)
PROT UR STRIP-MCNC: NEGATIVE MG/DL
RBC # BLD AUTO: 6 M/MM3 (ref 4.52–5.9)
RBC UR QL: NEGATIVE
SODIUM SERPL-SCNC: 138 MEQ/L (ref 136–145)
SP GR UR STRIP: 1.01 (ref 1–1.03)
UROBILINOGEN UR STRIP-ACNC: 0.2 (ref 0.2–1)
WBC # BLD AUTO: 14.11 K/MM3 (ref 3.9–11.3)

## 2025-01-13 PROCEDURE — 81003 URINALYSIS AUTO W/O SCOPE: CPT

## 2025-01-13 PROCEDURE — 96374 THER/PROPH/DIAG INJ IV PUSH: CPT

## 2025-01-13 PROCEDURE — 36415 COLL VENOUS BLD VENIPUNCTURE: CPT

## 2025-01-13 PROCEDURE — 99284 EMERGENCY DEPT VISIT MOD MDM: CPT

## 2025-01-13 PROCEDURE — 80053 COMPREHEN METABOLIC PANEL: CPT

## 2025-01-13 PROCEDURE — 85025 COMPLETE CBC W/AUTO DIFF WBC: CPT

## 2025-01-13 PROCEDURE — 96375 TX/PRO/DX INJ NEW DRUG ADDON: CPT

## 2025-01-13 RX ADMIN — DIPHENHYDRAMINE HYDROCHLORIDE ONE MG: 50 INJECTION, SOLUTION INTRAMUSCULAR; INTRAVENOUS at 14:50

## 2025-01-16 ENCOUNTER — HOSPITAL ENCOUNTER (EMERGENCY)
Dept: HOSPITAL 41 - JD.ED | Age: 55
Discharge: HOME | End: 2025-01-16
Payer: MEDICAID

## 2025-01-16 DIAGNOSIS — Z79.899: ICD-10-CM

## 2025-01-16 DIAGNOSIS — Z88.6: ICD-10-CM

## 2025-01-16 DIAGNOSIS — R06.6: Primary | ICD-10-CM

## 2025-01-16 LAB
ALBUMIN SERPL-MCNC: 3.9 G/DL (ref 3.4–5)
ALBUMIN/GLOB SERPL: 1.2 {RATIO} (ref 1–2)
ALP SERPL-CCNC: 77 U/L (ref 46–116)
ALT SERPL-CCNC: 20 U/L (ref 16–63)
ANION GAP SERPL CALC-SCNC: 14.8 MMOL/L (ref 5–15)
AST SERPL-CCNC: 13 U/L (ref 15–37)
BASOPHILS # BLD AUTO: 0.1 K/MM3 (ref 0–0.2)
BASOPHILS NFR BLD AUTO: 0.4 % (ref 0–1)
BILIRUB SERPL-MCNC: 0.5 MG/DL (ref 0.2–1)
BUN SERPL-MCNC: 10 MG/DL (ref 7–18)
BUN/CREAT SERPL: 11.1 (ref 14–18)
CALCIUM SERPL-MCNC: 9 MG/DL (ref 8.5–10.1)
CHLORIDE SERPL-SCNC: 101 MEQ/L (ref 98–107)
CO2 SERPL-SCNC: 26 MEQ/L (ref 21–32)
CREAT CL 24H UR+SERPL-VRATE: 67.62 ML/MIN
CREAT SERPL-MCNC: 0.9 MG/DL (ref 0.7–1.3)
EGFRCR SERPLBLD CKD-EPI 2021: 100 ML/MIN (ref 60–?)
EOSINOPHIL # BLD AUTO: 0 K/MM3 (ref 0–0.4)
EOSINOPHIL NFR BLD AUTO: 0.2 % (ref 0–6)
GLOBULIN SER-MCNC: 3.4 GM/DL
GLUCOSE SERPL-MCNC: 103 MG/DL (ref 70–99)
HCT VFR BLD AUTO: 49.7 % (ref 42–52)
HGB BLD-MCNC: 16.4 GM/DL (ref 14–18)
IMM GRANULOCYTES # BLD: 0.03 K/MM3 (ref 0–0.05)
IMM GRANULOCYTES NFR BLD: 0.3 % (ref 0–0.4)
LYMPHOCYTES # BLD AUTO: 1.2 K/MM3 (ref 1–4.8)
LYMPHOCYTES NFR BLD AUTO: 10.7 % (ref 24–44)
MCH RBC QN AUTO: 29 PG (ref 28–32)
MCHC RBC AUTO-ENTMCNC: 33 G/DL (ref 32–36)
MCHC RBC AUTO-ENTMCNC: 88 FL (ref 83–99)
MONOCYTES # BLD AUTO: 0.7 K/MM3 (ref 0–0.8)
MONOCYTES NFR BLD AUTO: 6 % (ref 0–8)
NEUTROPHILS # BLD AUTO: 9.5 K/MM3 (ref 1.8–7.7)
NEUTROPHILS NFR BLD AUTO: 82.4 % (ref 41–71)
NRBC BLD AUTO-RTO: 0 % (ref 0–0.02)
NRBC BLD AUTO-RTO: 0 % (ref 0–0.2)
PLATELET # BLD AUTO: 348 K/MM3 (ref 150–400)
PMV BLD AUTO: 8.5 FL (ref 9.4–12.4)
POTASSIUM SERPL-SCNC: 3.8 MEQ/L (ref 3.5–5.1)
PROT SERPL-MCNC: 7.3 G/DL (ref 6.4–8.2)
RBC # BLD AUTO: 5.65 M/MM3 (ref 4.52–5.9)
SODIUM SERPL-SCNC: 138 MEQ/L (ref 136–145)
WBC # BLD AUTO: 11.55 K/MM3 (ref 3.9–11.3)

## 2025-01-16 RX ADMIN — Medication PRN ML: at 18:57

## 2025-01-16 RX ADMIN — DIPHENHYDRAMINE HYDROCHLORIDE ONE MG: 50 INJECTION, SOLUTION INTRAMUSCULAR; INTRAVENOUS at 18:56

## 2025-01-24 RX ORDER — FLUTICASONE PROPIONATE 50 MCG
2 SPRAY, SUSPENSION (ML) NASAL DAILY
COMMUNITY

## 2025-01-28 ENCOUNTER — ANESTHESIA EVENT (OUTPATIENT)
Dept: SURGERY | Facility: CLINIC | Age: 55
End: 2025-01-28
Payer: MEDICARE

## 2025-01-29 ENCOUNTER — ANESTHESIA (OUTPATIENT)
Dept: SURGERY | Facility: CLINIC | Age: 55
End: 2025-01-29
Payer: MEDICARE

## 2025-01-29 ENCOUNTER — HOSPITAL ENCOUNTER (OUTPATIENT)
Facility: CLINIC | Age: 55
Discharge: HOME | End: 2025-01-29
Attending: NEUROLOGICAL SURGERY | Admitting: NEUROLOGICAL SURGERY
Payer: MEDICARE

## 2025-01-29 VITALS
HEIGHT: 65 IN | WEIGHT: 111 LBS | DIASTOLIC BLOOD PRESSURE: 96 MMHG | OXYGEN SATURATION: 98 % | BODY MASS INDEX: 18.49 KG/M2 | HEART RATE: 101 BPM | SYSTOLIC BLOOD PRESSURE: 129 MMHG | RESPIRATION RATE: 15 BRPM | TEMPERATURE: 98 F

## 2025-01-29 DIAGNOSIS — R06.6 INTRACTABLE HICCOUGHS: Primary | ICD-10-CM

## 2025-01-29 LAB
ANION GAP SERPL CALCULATED.3IONS-SCNC: 11 MMOL/L (ref 7–15)
APTT PPP: 27 SECONDS (ref 22–38)
BUN SERPL-MCNC: 18.1 MG/DL (ref 6–20)
CALCIUM SERPL-MCNC: 9.1 MG/DL (ref 8.8–10.4)
CHLORIDE SERPL-SCNC: 103 MMOL/L (ref 98–107)
CREAT SERPL-MCNC: 1.1 MG/DL (ref 0.67–1.17)
EGFRCR SERPLBLD CKD-EPI 2021: 79 ML/MIN/1.73M2
ERYTHROCYTE [DISTWIDTH] IN BLOOD BY AUTOMATED COUNT: 13.4 % (ref 10–15)
GLUCOSE SERPL-MCNC: 110 MG/DL (ref 70–99)
HCO3 SERPL-SCNC: 28 MMOL/L (ref 22–29)
HCT VFR BLD AUTO: 48.2 % (ref 40–53)
HGB BLD-MCNC: 15.3 G/DL (ref 13.3–17.7)
INR PPP: 0.95 (ref 0.85–1.15)
MCH RBC QN AUTO: 28.8 PG (ref 26.5–33)
MCHC RBC AUTO-ENTMCNC: 31.7 G/DL (ref 31.5–36.5)
MCV RBC AUTO: 91 FL (ref 78–100)
PLATELET # BLD AUTO: 317 10E3/UL (ref 150–450)
POTASSIUM SERPL-SCNC: 3.7 MMOL/L (ref 3.4–5.3)
RBC # BLD AUTO: 5.32 10E6/UL (ref 4.4–5.9)
SODIUM SERPL-SCNC: 142 MMOL/L (ref 135–145)
WBC # BLD AUTO: 7 10E3/UL (ref 4–11)

## 2025-01-29 PROCEDURE — 370N000017 HC ANESTHESIA TECHNICAL FEE, PER MIN: Performed by: NEUROLOGICAL SURGERY

## 2025-01-29 PROCEDURE — 85018 HEMOGLOBIN: CPT | Performed by: NURSE PRACTITIONER

## 2025-01-29 PROCEDURE — 250N000011 HC RX IP 250 OP 636: Performed by: NURSE PRACTITIONER

## 2025-01-29 PROCEDURE — C1778 LEAD, NEUROSTIMULATOR: HCPCS | Performed by: NEUROLOGICAL SURGERY

## 2025-01-29 PROCEDURE — 64568 OPN IMPLTJ CRNL NRV NEA&PG: CPT | Mod: LT | Performed by: NEUROLOGICAL SURGERY

## 2025-01-29 PROCEDURE — 85041 AUTOMATED RBC COUNT: CPT | Performed by: NURSE PRACTITIONER

## 2025-01-29 PROCEDURE — 82565 ASSAY OF CREATININE: CPT | Performed by: NURSE PRACTITIONER

## 2025-01-29 PROCEDURE — 360N000076 HC SURGERY LEVEL 3, PER MIN: Performed by: NEUROLOGICAL SURGERY

## 2025-01-29 PROCEDURE — 80048 BASIC METABOLIC PNL TOTAL CA: CPT | Performed by: NURSE PRACTITIONER

## 2025-01-29 PROCEDURE — 710N000012 HC RECOVERY PHASE 2, PER MINUTE: Performed by: NEUROLOGICAL SURGERY

## 2025-01-29 PROCEDURE — 710N000010 HC RECOVERY PHASE 1, LEVEL 2, PER MIN: Performed by: NEUROLOGICAL SURGERY

## 2025-01-29 PROCEDURE — 258N000003 HC RX IP 258 OP 636: Performed by: STUDENT IN AN ORGANIZED HEALTH CARE EDUCATION/TRAINING PROGRAM

## 2025-01-29 PROCEDURE — 250N000009 HC RX 250: Performed by: STUDENT IN AN ORGANIZED HEALTH CARE EDUCATION/TRAINING PROGRAM

## 2025-01-29 PROCEDURE — 999N000141 HC STATISTIC PRE-PROCEDURE NURSING ASSESSMENT: Performed by: NEUROLOGICAL SURGERY

## 2025-01-29 PROCEDURE — C1767 GENERATOR, NEURO NON-RECHARG: HCPCS | Performed by: NEUROLOGICAL SURGERY

## 2025-01-29 PROCEDURE — 85610 PROTHROMBIN TIME: CPT | Performed by: NURSE PRACTITIONER

## 2025-01-29 PROCEDURE — 36415 COLL VENOUS BLD VENIPUNCTURE: CPT | Performed by: NURSE PRACTITIONER

## 2025-01-29 PROCEDURE — C1894 INTRO/SHEATH, NON-LASER: HCPCS | Performed by: NEUROLOGICAL SURGERY

## 2025-01-29 PROCEDURE — 250N000011 HC RX IP 250 OP 636: Performed by: NEUROLOGICAL SURGERY

## 2025-01-29 PROCEDURE — 85730 THROMBOPLASTIN TIME PARTIAL: CPT | Performed by: NURSE PRACTITIONER

## 2025-01-29 PROCEDURE — 272N000001 HC OR GENERAL SUPPLY STERILE: Performed by: NEUROLOGICAL SURGERY

## 2025-01-29 PROCEDURE — 250N000011 HC RX IP 250 OP 636: Performed by: STUDENT IN AN ORGANIZED HEALTH CARE EDUCATION/TRAINING PROGRAM

## 2025-01-29 PROCEDURE — 250N000025 HC SEVOFLURANE, PER MIN: Performed by: NEUROLOGICAL SURGERY

## 2025-01-29 PROCEDURE — 250N000009 HC RX 250: Performed by: NEUROLOGICAL SURGERY

## 2025-01-29 DEVICE — IMP GENERATOR VNS SENTIVA MODEL 1000: Type: IMPLANTABLE DEVICE | Site: CHEST | Status: FUNCTIONAL

## 2025-01-29 DEVICE — IMPLANTABLE LEAD
Type: IMPLANTABLE DEVICE | Site: NECK | Status: FUNCTIONAL
Brand: VNS THERAPY® PERENNIAFLEX® MODEL 304 LEAD

## 2025-01-29 RX ORDER — SODIUM CHLORIDE, SODIUM LACTATE, POTASSIUM CHLORIDE, CALCIUM CHLORIDE 600; 310; 30; 20 MG/100ML; MG/100ML; MG/100ML; MG/100ML
INJECTION, SOLUTION INTRAVENOUS CONTINUOUS PRN
Status: DISCONTINUED | OUTPATIENT
Start: 2025-01-29 | End: 2025-01-29

## 2025-01-29 RX ORDER — ONDANSETRON 2 MG/ML
4 INJECTION INTRAMUSCULAR; INTRAVENOUS EVERY 30 MIN PRN
Status: DISCONTINUED | OUTPATIENT
Start: 2025-01-29 | End: 2025-01-29 | Stop reason: HOSPADM

## 2025-01-29 RX ORDER — ACETAMINOPHEN 325 MG/1
650 TABLET ORAL EVERY 6 HOURS PRN
Qty: 30 TABLET | Refills: 0 | Status: SHIPPED | OUTPATIENT
Start: 2025-01-29

## 2025-01-29 RX ORDER — CEFAZOLIN SODIUM/WATER 2 G/20 ML
2 SYRINGE (ML) INTRAVENOUS
Status: COMPLETED | OUTPATIENT
Start: 2025-01-29 | End: 2025-01-29

## 2025-01-29 RX ORDER — ONDANSETRON 4 MG/1
4 TABLET, ORALLY DISINTEGRATING ORAL EVERY 30 MIN PRN
Status: DISCONTINUED | OUTPATIENT
Start: 2025-01-29 | End: 2025-01-29 | Stop reason: HOSPADM

## 2025-01-29 RX ORDER — LIDOCAINE HYDROCHLORIDE 20 MG/ML
INJECTION, SOLUTION INFILTRATION; PERINEURAL PRN
Status: DISCONTINUED | OUTPATIENT
Start: 2025-01-29 | End: 2025-01-29

## 2025-01-29 RX ORDER — OXYCODONE HYDROCHLORIDE 5 MG/1
5 TABLET ORAL
Status: DISCONTINUED | OUTPATIENT
Start: 2025-01-29 | End: 2025-01-29 | Stop reason: HOSPADM

## 2025-01-29 RX ORDER — ONDANSETRON 2 MG/ML
INJECTION INTRAMUSCULAR; INTRAVENOUS PRN
Status: DISCONTINUED | OUTPATIENT
Start: 2025-01-29 | End: 2025-01-29

## 2025-01-29 RX ORDER — PROPOFOL 10 MG/ML
INJECTION, EMULSION INTRAVENOUS PRN
Status: DISCONTINUED | OUTPATIENT
Start: 2025-01-29 | End: 2025-01-29

## 2025-01-29 RX ORDER — NALOXONE HYDROCHLORIDE 0.4 MG/ML
0.1 INJECTION, SOLUTION INTRAMUSCULAR; INTRAVENOUS; SUBCUTANEOUS
Status: DISCONTINUED | OUTPATIENT
Start: 2025-01-29 | End: 2025-01-29 | Stop reason: HOSPADM

## 2025-01-29 RX ORDER — GLYCOPYRROLATE 0.2 MG/ML
INJECTION, SOLUTION INTRAMUSCULAR; INTRAVENOUS PRN
Status: DISCONTINUED | OUTPATIENT
Start: 2025-01-29 | End: 2025-01-29

## 2025-01-29 RX ORDER — FENTANYL CITRATE 50 UG/ML
50 INJECTION, SOLUTION INTRAMUSCULAR; INTRAVENOUS EVERY 5 MIN PRN
Status: DISCONTINUED | OUTPATIENT
Start: 2025-01-29 | End: 2025-01-29 | Stop reason: HOSPADM

## 2025-01-29 RX ORDER — CEFAZOLIN SODIUM/WATER 2 G/20 ML
2 SYRINGE (ML) INTRAVENOUS SEE ADMIN INSTRUCTIONS
Status: DISCONTINUED | OUTPATIENT
Start: 2025-01-29 | End: 2025-01-29 | Stop reason: HOSPADM

## 2025-01-29 RX ORDER — ACETAMINOPHEN 325 MG/1
975 TABLET ORAL ONCE
Status: COMPLETED | OUTPATIENT
Start: 2025-01-29 | End: 2025-01-29

## 2025-01-29 RX ORDER — CEPHALEXIN 500 MG/1
500 CAPSULE ORAL 4 TIMES DAILY
Qty: 28 CAPSULE | Refills: 0 | Status: SHIPPED | OUTPATIENT
Start: 2025-01-29 | End: 2025-02-05

## 2025-01-29 RX ORDER — HYDROMORPHONE HCL IN WATER/PF 6 MG/30 ML
0.2 PATIENT CONTROLLED ANALGESIA SYRINGE INTRAVENOUS EVERY 5 MIN PRN
Status: DISCONTINUED | OUTPATIENT
Start: 2025-01-29 | End: 2025-01-29 | Stop reason: HOSPADM

## 2025-01-29 RX ORDER — FENTANYL CITRATE 50 UG/ML
25 INJECTION, SOLUTION INTRAMUSCULAR; INTRAVENOUS EVERY 5 MIN PRN
Status: DISCONTINUED | OUTPATIENT
Start: 2025-01-29 | End: 2025-01-29 | Stop reason: HOSPADM

## 2025-01-29 RX ORDER — DEXAMETHASONE SODIUM PHOSPHATE 4 MG/ML
INJECTION, SOLUTION INTRA-ARTICULAR; INTRALESIONAL; INTRAMUSCULAR; INTRAVENOUS; SOFT TISSUE PRN
Status: DISCONTINUED | OUTPATIENT
Start: 2025-01-29 | End: 2025-01-29

## 2025-01-29 RX ORDER — HYDROMORPHONE HCL IN WATER/PF 6 MG/30 ML
0.4 PATIENT CONTROLLED ANALGESIA SYRINGE INTRAVENOUS EVERY 5 MIN PRN
Status: DISCONTINUED | OUTPATIENT
Start: 2025-01-29 | End: 2025-01-29 | Stop reason: HOSPADM

## 2025-01-29 RX ORDER — FENTANYL CITRATE 50 UG/ML
INJECTION, SOLUTION INTRAMUSCULAR; INTRAVENOUS PRN
Status: DISCONTINUED | OUTPATIENT
Start: 2025-01-29 | End: 2025-01-29

## 2025-01-29 RX ORDER — OXYCODONE HYDROCHLORIDE 5 MG/1
5 TABLET ORAL EVERY 6 HOURS PRN
Qty: 12 TABLET | Refills: 0 | Status: SHIPPED | OUTPATIENT
Start: 2025-01-29 | End: 2025-02-01

## 2025-01-29 RX ORDER — SODIUM CHLORIDE, SODIUM LACTATE, POTASSIUM CHLORIDE, CALCIUM CHLORIDE 600; 310; 30; 20 MG/100ML; MG/100ML; MG/100ML; MG/100ML
INJECTION, SOLUTION INTRAVENOUS CONTINUOUS
Status: DISCONTINUED | OUTPATIENT
Start: 2025-01-29 | End: 2025-01-29 | Stop reason: HOSPADM

## 2025-01-29 RX ADMIN — Medication 20 MG: at 09:28

## 2025-01-29 RX ADMIN — Medication 2 G: at 08:16

## 2025-01-29 RX ADMIN — HYDROMORPHONE HYDROCHLORIDE 0.25 MG: 1 INJECTION, SOLUTION INTRAMUSCULAR; INTRAVENOUS; SUBCUTANEOUS at 10:28

## 2025-01-29 RX ADMIN — Medication 50 MG: at 08:02

## 2025-01-29 RX ADMIN — Medication 100 MG: at 11:59

## 2025-01-29 RX ADMIN — ONDANSETRON 4 MG: 2 INJECTION INTRAMUSCULAR; INTRAVENOUS at 11:33

## 2025-01-29 RX ADMIN — GLYCOPYRROLATE 0.1 MG: 0.2 INJECTION, SOLUTION INTRAMUSCULAR; INTRAVENOUS at 09:03

## 2025-01-29 RX ADMIN — PHENYLEPHRINE HYDROCHLORIDE 50 MCG: 10 INJECTION INTRAVENOUS at 09:17

## 2025-01-29 RX ADMIN — MIDAZOLAM 1 MG: 1 INJECTION INTRAMUSCULAR; INTRAVENOUS at 07:27

## 2025-01-29 RX ADMIN — FENTANYL CITRATE 25 MCG: 50 INJECTION INTRAMUSCULAR; INTRAVENOUS at 10:12

## 2025-01-29 RX ADMIN — PHENYLEPHRINE HYDROCHLORIDE 50 MCG: 10 INJECTION INTRAVENOUS at 08:10

## 2025-01-29 RX ADMIN — FENTANYL CITRATE 50 MCG: 50 INJECTION INTRAMUSCULAR; INTRAVENOUS at 08:13

## 2025-01-29 RX ADMIN — PROPOFOL 20 MG: 10 INJECTION, EMULSION INTRAVENOUS at 09:28

## 2025-01-29 RX ADMIN — Medication 100 MG: at 11:38

## 2025-01-29 RX ADMIN — PROPOFOL 20 MG: 10 INJECTION, EMULSION INTRAVENOUS at 10:16

## 2025-01-29 RX ADMIN — PROPOFOL 50 MG: 10 INJECTION, EMULSION INTRAVENOUS at 11:31

## 2025-01-29 RX ADMIN — PROPOFOL 80 MG: 10 INJECTION, EMULSION INTRAVENOUS at 08:02

## 2025-01-29 RX ADMIN — LIDOCAINE HYDROCHLORIDE 60 MG: 20 INJECTION, SOLUTION INFILTRATION; PERINEURAL at 07:51

## 2025-01-29 RX ADMIN — FENTANYL CITRATE 50 MCG: 50 INJECTION INTRAMUSCULAR; INTRAVENOUS at 07:51

## 2025-01-29 RX ADMIN — GLYCOPYRROLATE 0.1 MG: 0.2 INJECTION, SOLUTION INTRAMUSCULAR; INTRAVENOUS at 09:17

## 2025-01-29 RX ADMIN — SODIUM CHLORIDE, POTASSIUM CHLORIDE, SODIUM LACTATE AND CALCIUM CHLORIDE: 600; 310; 30; 20 INJECTION, SOLUTION INTRAVENOUS at 07:29

## 2025-01-29 RX ADMIN — DEXAMETHASONE SODIUM PHOSPHATE 4 MG: 4 INJECTION, SOLUTION INTRA-ARTICULAR; INTRALESIONAL; INTRAMUSCULAR; INTRAVENOUS; SOFT TISSUE at 08:16

## 2025-01-29 RX ADMIN — FENTANYL CITRATE 25 MCG: 50 INJECTION INTRAMUSCULAR; INTRAVENOUS at 09:33

## 2025-01-29 RX ADMIN — Medication 10 MG: at 10:12

## 2025-01-29 ASSESSMENT — ACTIVITIES OF DAILY LIVING (ADL)
ADLS_ACUITY_SCORE: 38
ADLS_ACUITY_SCORE: 38
ADLS_ACUITY_SCORE: 39
ADLS_ACUITY_SCORE: 38
ADLS_ACUITY_SCORE: 38
ADLS_ACUITY_SCORE: 39
ADLS_ACUITY_SCORE: 38
ADLS_ACUITY_SCORE: 39
ADLS_ACUITY_SCORE: 38
ADLS_ACUITY_SCORE: 38
ADLS_ACUITY_SCORE: 35

## 2025-01-29 NOTE — BRIEF OP NOTE
[facility]    Brief Operative Note    Pre-operative diagnosis: Intractable hiccups [R06.6]  Post-operative diagnosis Same as pre-operative diagnosis    Procedure: Vagus nerve stimulator placement with placement of generator/battery over left chest wall, Left - Chest    Surgeon: Surgeons and Role:     * Jason Fischer MD - Primary     * Lisa Goddard MD - Resident - Assisting  Anesthesia: General   Estimated Blood Loss: 10cc    Drains: None  Specimens: * No specimens in log *  Findings:   Impedances normal at end of the case.  Complications: None.  Implants:   Implant Name Type Inv. Item Serial No.  Lot No. LRB No. Used Action   IMP LEAD SNGL VNS PERENNIAFLEX MODEL 304 - [id number] Leads IMP LEAD SNGL VNS PERENNIAFLEX MODEL 304 [id number]  Left 1 Implanted   IMP GENERATOR VNS SENTIVA MODEL 1000 - [id number] Neurology device IMP GENERATOR VNS SENTIVA MODEL 1000 [id number] LIVANOVA  Left 1 Implanted

## 2025-01-29 NOTE — DISCHARGE INSTRUCTIONS
Contacting your Doctor -   To contact a doctor, call Dr. Fischer at [phone] or:  [phone] and ask for the resident on call for Neurosurgery (answered 24 hours a day)   Emergency Department:  [facility]: [phone]  [facility]: [phone] 911 if you are in need of immediate or emergent help

## 2025-01-29 NOTE — PROGRESS NOTES
I met with Roque and his family this morning to discuss the surgery again especially for its use in Roque with intractable hiccups. We discussed that this is off-label, what off-label means and implies, and the spectrum of risk that comes along with that. We discussed that this is a palliative procedure with the purpose of reducing his intractable hiccups to improve his quality of life but without any guarantees and only some evidence from the literature that it is possible for it to help. We discussed at length the alternatives, potential benefits, and potentials risks of VNS therapy. We discussed MRI compatibility and the difficulty of finding other providers closer to where they live who would be comfortable programming the device.  We broke the risks down into procedural risks, risks of general anesthesia, and risks of stimulation. We specifically discussed the risk of typical and atypical pain, hemorrhage, postoperative hemorrhage and hematoma including the need for surgical evacuation, infection including serious infection of the neck or chest, need for further surgeries, vocal cord palsy or paralysis, damage to cranial nerves, damage to the carotid artery with or without stroke, damage to the jugular veins, MI, risks of anesthesia, hoarseness, vocal cord dysfunction, sensation or pain around the anchors and non efficacy. They understand these risks and actively participated. They asked several questions which I answered in full until everyone was satisfied.     Jason Fischer MD

## 2025-01-29 NOTE — OP NOTE
PATIENT NAME: Roque Yang  PATIENT MRN: [id number]  PATIENT ACCOUNT: [id number]  PATIENT YOB: 1970    NEUROSURGERY OPERATIVE REPORT     DATE OF SURGERY: 01/29/25     PREOPERATIVE DIAGNOSIS:    Intractable hiccups  Cerebral Palsy     POSTOPERATIVE DIAGNOSIS:  Intractable hiccups  Cerebral Palsy     PROCEDURES PERFORMED:  Left sided vagus nerve stimulator placement  Placement of with placement of generator/battery over left chest wall using one electrode array  Use of intraoperative interrogation and electrical analysis of the vagal nerve stimulator system.     STAFF SURGEON:  Jason Fischer MD     SPECIMEN: None    RESIDENT SURGEON:  Lisa Goddard MD     ESTIMATED BLOOD LOSS: 10 mL     EXPLANTS: None     IMPLANTS:   Implant Name Type Inv. Item Serial No.  Lot No. LRB No. Used Action   IMP LEAD SNGL VNS PERENNIAFLEX MODEL 304 - [id number] Leads IMP LEAD SNGL VNS PERENNIAFLEX MODEL 304 [id number]  Left 1 Implanted   IMP GENERATOR VNS SENTIVA MODEL 1000 - [id number] Neurology device IMP GENERATOR VNS SENTIVA MODEL 1000 [id number] LIVANOVA  Left 1 Implanted       FINDINGS:  Impedances normal at the end of case.    INDICATION FOR THE PROCEDURE:  54-year-old male with a 14-year history of debilitating intractable hiccups, profoundly impairing his quality of life. His symptoms occur approximately 90% of each day, severely limiting his ability to eat, drink, and sleep. He experiences frequent nausea and abdominal discomfort associated with his hiccups, which have progressively worsened in frequency and intensity over the years. Despite extensive evaluations and a broad range of therapeutic interventions, his hiccups remain refractory to treatment. Risks and benefits regarding VNS implantation were discussed with family and patient extensively (See full note from Dr Fischer on 12/03/2024).        DESCRIPTION OF PROCEDURE:  The patient was brought to the operating room and her identity was confirmed. General  anesthesia was initiated. The patient was positioned supine and all pressure points were padded. The surgical site was prepped and draped in a common sterile surgical fashion using chlorhexidine scrub, Betadine and ChloraPrep. A time out was performed confirming the patient's name, the procedure to be performed and the side of the procedure.      A roughly 4cm incision was planned centered over the medial border of the sternocleidomastoid muscle and roughly 2 fingerbreadth's above the clavicle. A second incision roughly 3 fingerbreadth's below the clavicle was also planned measuring 5cm. 10 mL of local anesthetic was infiltrated in total along the cervical as well as the left upper chest wall incision. Using a #10 blade, incision was made through skin and dermis. Subsequently, using the monopolar cautery, subcutaneous tissue was dissected until the platysma was identified. With the Metzenbaum scissor, we dissected above the platysmal layer using blunt dissection to better visualize the platysma.  Self-retaining retractors were then placed to provide visualization. The platysma muscle was divided using monopolar cautery.  Subplatysmal dissection was performed both superiorly and inferiorly to mobilize the tissue.  The medial edge of the sternocleidomastoid and the entirety of the omohyoid muscles were identified.  We identified the avascular plane between these two muscles and followed this plane down using a gentle blunt dissecting technique to the carotid artery and jugular vein, palpating the artery along the way for identification.  The carotid sheath was identified. The ansa cervicalis was also identified and gently retracted out of view and out of the plane of dissection. Of note, there was significant depth required to reach the carotid sheath.      We were able to bluntly dissect to the carotid sheath and we opened it with Metzenbaum scissors.  We identified the jugular vein laterally and the carotid artery  medially and deep and found the vagus nerve lying between the two vessels.  We dissected it circumferentially, both superiorly and inferiorly, to mobilize it for electrode placement exposing at least 3cm.  We then passed a vessel loop around the vagus nerve in order to isolate it. Overall, there was only minimal bleeding which was controlled with the bipolar.     We then turned our attention to the previously planned left upper chest wall incision.  Using a #10 blade, we incised through skin and underlying dermis. Monopolar cautery was used to pass though the fat layer. Of note, patient had had very minimal subdermal fat. However, we dissected deeper and passed through several layers of pectoralis fascia and found an avascular plane--a small pocket was created.  Using the tunneling device, the left upper chest wall incision was connected to the left lateral neck surgical wound.  The lead was brought into the field and tunneled through to connect both surgical sites. Next, the coils of the vagal nerve stimulator electrode were then carefully wrapped around the vagus nerve with enough slack left such that there was no strain on the nerve itself.  Once all the coils were in place, a loop in the electrode wire was created to provide strain relief.  The wire was then secured to the medial and superficial aspect of the sternocleidomastoid muscle using three anchoring clips.  Meticulous hemostasis was obtained. The area was generously irrigated and dried.      A new vagal nerve stimulator generator/battery was brought onto the field.  Connection between the electrode and the generator/battery was made and secured.  Therefore, one electrode array was connected to the new vagal nerve stimulator.  The excess wire was then curled behind the generator/battery, taking care not to cross the wires, and the generator/battery along with the wire was placed in the pocket.  The generator/battery was also secured within the pocket  using one 2-0 Ethibond suture. With the proper placement and connection of the vagal nerve stimulator system, electrical interrogation and analysis was performed. All the impedance values were noted to be within expected limits.      We began closing the wounds.  The left chest incision was closed in the following manner.  The pocket capsule layer was reapproximated using 3-0 Vicryl sutures in an inverted interrupted fashion.  Subcutaneous tissue was reapproximated using 3-0 Vicryl sutures in an inverted interrupted fashion and the dermal layer was reapproximated using 3-0 Vicryl sutures in an inverted interrupted fashion.  The skin was reapproximated using 4-0 Monocryl suture in a subcuticular fashion.  The neck incision was closed in the following manner.  The platysma was reapproximated using 3-0 Vicryl sutures in a simple interrupted fashion.  The subcutaneous layer and the dermal layer were reapproximated using 3-0 Vicryl sutures in an inverted interrupted fashion.  The skin was reapproximated using 4-0 Monocryl suture in a subcuticular fashion.  Both wounds were then cleaned with wet and dry sponges followed by ChloraPrep and then Dermabond skin glue was applied.       We tested the system one more time and again found appropriate impedances.      The patient was extubated and transferred to PACU. The patient tolerated the procedure well. At the end of the procedure, all counts of sponges, needles and instruments were correct x2.     Dr. Fischer was present for the key portions of the procedure and immediately available for the rest.        As dictated by:  Lisa Goddard MD  Department of Neurosurgery, PGY-5

## 2025-01-29 NOTE — ANESTHESIA POSTPROCEDURE EVALUATION
Patient: Roque Yang    Procedure: Procedure(s):  Vagus nerve stimulator placement with placement of generator/battery over left chest wall       Anesthesia Type:  General    Note:  Disposition: Outpatient   Postop Pain Control: Uneventful            Sign Out: Well controlled pain   PONV: No   Neuro/Psych: Uneventful            Sign Out: Acceptable/Baseline neuro status   Airway/Respiratory: Uneventful            Sign Out: Acceptable/Baseline resp. status   CV/Hemodynamics: Uneventful            Sign Out: Acceptable CV status; No obvious hypovolemia; No obvious fluid overload   Other NRE: NONE   DID A NON-ROUTINE EVENT OCCUR? No           Last vitals:  Vitals Value Taken Time   /83 01/29/25 1345   Temp 36.9  C (98.5  F) 01/29/25 1330   Pulse 108 01/29/25 1351   Resp 18 01/29/25 1351   SpO2 95 % 01/29/25 1351   Vitals shown include unfiled device data.    Electronically Signed By: Lita Ramirez MD  January 29, 2025  1:52 PM

## 2025-01-29 NOTE — ANESTHESIA PROCEDURE NOTES
Airway       Patient location during procedure: OR       Procedure Start/Stop Times: 1/29/2025 8:04 AM  Staff -        Other Anesthesia Staff: Alexi Gaines       Performed By: SRNA  Consent for Airway        Urgency: elective  Indications and Patient Condition       Indications for airway management: kwaku-procedural       Induction type:RSI       Mask difficulty assessment: 0 - not attempted    Final Airway Details       Final airway type: endotracheal airway       Successful airway: ETT - single  Endotracheal Airway Details        ETT size (mm): 7.5       Cuffed: yes       Successful intubation technique: video laryngoscopy       VL Blade Size: Glidescope 3       Grade View of Cords: 1       Adjucts: stylet       Position: Right       Measured from: gums/teeth       Secured at (cm): 23       Bite block used: None    Post intubation assessment        Placement verified by: capnometry, equal breath sounds and chest rise        Number of attempts at approach: 1       Number of other approaches attempted: 0       Secured with: ties       Ease of procedure: easy       Dentition: Unchanged and Intact    Medication(s) Administered   Medication Administration Time: 1/29/2025 8:04 AM

## 2025-01-29 NOTE — ANESTHESIA CARE TRANSFER NOTE
Patient: Roque Ynag    Procedure: Procedure(s):  Vagus nerve stimulator placement with placement of generator/battery over left chest wall       Diagnosis: Intractable hiccups [R06.6]  Diagnosis Additional Information: No value filed.    Anesthesia Type:   General     Note:    Oropharynx: oropharynx clear of all foreign objects and spontaneously breathing  Level of Consciousness: drowsy  Oxygen Supplementation: face mask  Level of Supplemental Oxygen (L/min / FiO2): 6  Independent Airway: airway patency satisfactory and stable  Dentition: dentition unchanged  Vital Signs Stable: post-procedure vital signs reviewed and stable  Report to RN Given: handoff report given  Patient transferred to: PACU    Handoff Report: Identifed the Patient, Identified the Reponsible Provider, Reviewed the pertinent medical history, Discussed the surgical course, Reviewed Intra-OP anesthesia mangement and issues during anesthesia, Set expectations for post-procedure period and Allowed opportunity for questions and acknowledgement of understanding      Vitals:  Vitals Value Taken Time   /94 01/29/25 1210   Temp     Pulse 107 01/29/25 1215   Resp 13 01/29/25 1215   SpO2 100 % 01/29/25 1215   Vitals shown include unfiled device data.    Electronically Signed By: MELISSA Romero CRNA  January 29, 2025  12:15 PM

## 2025-01-29 NOTE — ANESTHESIA PREPROCEDURE EVALUATION
"Anesthesia Pre-Procedure Evaluation    Patient: Roque Yang   MRN: [id number] : 1970        Procedure : Procedure(s):  Vagus nerve stimulator placement with placement of generator/battery over left chest wall          Past Medical History:   Diagnosis Date    Intractable hiccoughs 2024      History reviewed. No pertinent surgical history.   Allergies   Allergen Reactions    Aspirin Hives and Rash      Social History     Tobacco Use    Smoking status: Never    Smokeless tobacco: Never   Substance Use Topics    Alcohol use: Yes     Comment: occasional      Wt Readings from Last 1 Encounters:   No data found for Wt        Anesthesia Evaluation   Pt has had prior anesthetic. Type: MAC.    No history of anesthetic complications       ROS/MED HX  ENT/Pulmonary:  - neg pulmonary ROS     Neurologic: Comment: Spastic cerebral palsy       Cardiovascular:  - neg cardiovascular ROS     METS/Exercise Tolerance:     Hematologic:  - neg hematologic  ROS     Musculoskeletal:       GI/Hepatic: Comment: Intractable hiccups    (+) GERD,  esophageal disease (barretts esophagus),                 Renal/Genitourinary:  - neg Renal ROS     Endo:  - neg endo ROS     Psychiatric/Substance Use:  - neg psychiatric ROS     Infectious Disease:  - neg infectious disease ROS     Malignancy:       Other:            Physical Exam    Airway      Comment: Contractures of the neck, but full ROM. Large beard    Mallampati: III   TM distance: > 3 FB   Neck ROM: full   Mouth opening: > 3 cm    Respiratory Devices and Support         Dental       (+) Minor Abnormalities - some fillings, tiny chips      Cardiovascular   cardiovascular exam normal       Rhythm and rate: regular and normal     Pulmonary   pulmonary exam normal        breath sounds clear to auscultation           OUTSIDE LABS:  CBC: No results found for: \"WBC\", \"HGB\", \"HCT\", \"PLT\"  BMP: No results found for: \"NA\", \"POTASSIUM\", \"CHLORIDE\", \"CO2\", \"BUN\", \"CR\", \"GLC\"  COAGS: No " "results found for: \"PTT\", \"INR\", \"FIBR\"  POC: No results found for: \"BGM\", \"HCG\", \"HCGS\"  HEPATIC: No results found for: \"ALBUMIN\", \"PROTTOTAL\", \"ALT\", \"AST\", \"GGT\", \"ALKPHOS\", \"BILITOTAL\", \"BILIDIRECT\", \"MONTSE\"  OTHER: No results found for: \"PH\", \"LACT\", \"A1C\", \"NOEL\", \"PHOS\", \"MAG\", \"LIPASE\", \"AMYLASE\", \"TSH\", \"T4\", \"T3\", \"CRP\", \"SED\"    Anesthesia Plan    ASA Status:  2    NPO Status:  NPO Appropriate    Anesthesia Type: General.     - Airway: ETT   Induction: Intravenous, Propofol, RSI.   Maintenance: Balanced.   Techniques and Equipment:     - Airway: Video-Laryngoscope     - Lines/Monitors: BIS     Consents    Anesthesia Plan(s) and associated risks, benefits, and realistic alternatives discussed. Questions answered and patient/representative(s) expressed understanding.     - Discussed: Risks, Benefits and Alternatives for BOTH SEDATION and the PROCEDURE were discussed     - Discussed with:  Patient      - Extended Intubation/Ventilatory Support Discussed: No.      - Patient is DNR/DNI Status: No     Use of blood products discussed: No .     Postoperative Care    Pain management: IV analgesics, Oral pain medications, Multi-modal analgesia.   PONV prophylaxis: Ondansetron (or other 5HT-3), Dexamethasone or Solumedrol     Comments:    Other Comments: 53 yo male with spastic cerebral palsy, intractable hiccups and GERD presenting for vagus nerve stimulator. Plan GETA with standard ASA monitors, BIS.            Lita Ramirez MD    I have reviewed the pertinent notes and labs in the chart from the past 30 days and (re)examined the patient.  Any updates or changes from those notes are reflected in this note.    Clinically Significant Risk Factors Present on Admission                                          "

## 2025-01-30 ENCOUNTER — PATIENT OUTREACH (OUTPATIENT)
Dept: NEUROSURGERY | Facility: CLINIC | Age: 55
End: 2025-01-30
Payer: COMMERCIAL

## 2025-01-30 NOTE — PROGRESS NOTES
Neurosurgery Discharge Coordination Note     Attending physician: Dr. Fischer  Surgery performed: Vagus nerve stimulator placement with generator/battery over the left chest wall (for intractable hiccups)  Date of Discharge: 1/30/25  Discharge to: Home     Current status: Spoke with Precious, with pt listening to call. Pt is doing well overall. He took Tylenol last night for postop incision pain. before bed and hasn't had anything for pain this morning. He has been resting most of the morning and will probably take some Tylenol soon.   Denies redness, swelling, increased tenderness, drainage, incision opening or elevated temp. infection.  Denies new bowel or bladder issues.    Pt hiccupped once last night and so far has not hiccupped today. Pt's understanding is that the VNS is on.     Pt is wondering if he should have received VNS magenets, as Jeff crenshaw discussed the magnets with him. Will follow up about this and get back to patient. They would like me to fax the operative report to Dr. Majano once the note is signed. Clinic phone number is [phone]. Will call clinic for fax number.     Discharge instructions and medications reviewed with patient.  Follow up appointments/imaging/tests needed: 2 week post op with NP Alivia Ibanez on 2/14 at 10:00.     RN triage/on call number given: [phone]/ [phone]  Transitions of Care Outreach  Chief Complaint   Patient presents with    Clinic Care Coordination - Post Hospital       Most Recent Admission Date: 1/29/2025   Most Recent Admission Diagnosis: Intractable hiccups - R06.6     Most Recent Discharge Date: 1/29/2025   Most Recent Discharge Diagnosis: Intractable hiccoughs - R06.6     Transitions of Care Assessment    Discharge Assessment  How are your symptoms? (Red Flag symptoms escalate to triage hotline per guidelines): Improved  Do you know how to contact your clinic care team if you have future questions or changes to your health status? : Yes  Does  the patient have their discharge instructions? : Yes  Does the patient have questions regarding their discharge instructions? : No  Were you started on any new medications or were there changes to any of your previous medications? : Yes  Does the patient have all of their medications?: Yes  Do you have questions regarding any of your medications? : No  Do you have all of your needed medical supplies or equipment (DME)?  (i.e. oxygen tank, CPAP, cane, etc.): Yes    Post-op ([facility] Only)  If the patient had a surgery or procedure, do they have any questions for a nurse?: Yes (see comment) (Jeff bautista - should they have received one?)    Post-op (Clinicians Only)  Did the patient have surgery or a procedure: Yes  Incision: closed, healing  Drainage: No  Bleeding: none  Fever: No  Chills: No  Redness: No  Warmth: No  Swelling: No  Incision site pain: Yes  Closure: suture, dissolving  Eating & Drinking: eating and drinking without complaints/concerns  PO Intake: regular diet  Bowel Function: normal  Urinary Status: voiding without complaint/concerns      Follow up Plan     Discharge Follow-Up  Discharge follow up appointment scheduled in alignment with recommended follow up timeframe or Transitions of Risk Category? (Low = within 30 days; Moderate= within 14 days; High= within 7 days): Yes  Discharge Follow Up Appointment Date: 02/14/25  Discharge Follow Up Appointment Scheduled with?: Specialty Care Provider    Future Appointments   Date Time Provider Department Center   2/14/2025 10:00 AM Alivia Ibanez APRN [facility]       Outpatient Plan as outlined on AVS reviewed with patient.    For any urgent concerns, please contact our 24 hour nurse triage line: [phone] ([phone])       MARCUS HARDY RN

## 2025-02-12 ENCOUNTER — HOSPITAL ENCOUNTER (EMERGENCY)
Dept: HOSPITAL 41 - JD.ED | Age: 55
LOS: 1 days | Discharge: HOME | End: 2025-02-13
Payer: MEDICARE

## 2025-02-12 DIAGNOSIS — Z88.5: ICD-10-CM

## 2025-02-12 DIAGNOSIS — Z79.899: ICD-10-CM

## 2025-02-12 DIAGNOSIS — K21.9: ICD-10-CM

## 2025-02-12 DIAGNOSIS — M62.838: ICD-10-CM

## 2025-02-12 DIAGNOSIS — R06.6: Primary | ICD-10-CM

## 2025-02-12 LAB
ALBUMIN SERPL-MCNC: 4.3 G/DL (ref 3.4–5)
ALBUMIN/GLOB SERPL: 1.3 {RATIO} (ref 1–2)
ALP SERPL-CCNC: 98 U/L (ref 46–116)
ALT SERPL-CCNC: 23 U/L (ref 16–63)
ANION GAP SERPL CALC-SCNC: 15.4 MMOL/L (ref 5–15)
AST SERPL-CCNC: 13 U/L (ref 15–37)
BASOPHILS # BLD AUTO: 0 K/MM3 (ref 0–0.2)
BASOPHILS NFR BLD AUTO: 0.4 % (ref 0–1)
BILIRUB SERPL-MCNC: 0.6 MG/DL (ref 0.2–1)
BUN SERPL-MCNC: 7 MG/DL (ref 7–18)
BUN/CREAT SERPL: 7.8 (ref 14–18)
CALCIUM SERPL-MCNC: 9.2 MG/DL (ref 8.5–10.1)
CHLORIDE SERPL-SCNC: 101 MEQ/L (ref 98–107)
CO2 SERPL-SCNC: 27 MEQ/L (ref 21–32)
CREAT CL 24H UR+SERPL-VRATE: 79.38 ML/MIN
CREAT SERPL-MCNC: 0.9 MG/DL (ref 0.7–1.3)
EGFRCR SERPLBLD CKD-EPI 2021: 100 ML/MIN (ref 60–?)
EOSINOPHIL # BLD AUTO: 0 K/MM3 (ref 0–0.4)
EOSINOPHIL NFR BLD AUTO: 0.1 % (ref 0–6)
GLOBULIN SER-MCNC: 3.3 GM/DL
GLUCOSE SERPL-MCNC: 127 MG/DL (ref 70–99)
HCT VFR BLD AUTO: 50 % (ref 42–52)
HGB BLD-MCNC: 16.9 GM/DL (ref 14–18)
IMM GRANULOCYTES # BLD: 0.02 K/MM3 (ref 0–0.05)
IMM GRANULOCYTES NFR BLD: 0.2 % (ref 0–0.4)
LYMPHOCYTES # BLD AUTO: 1.3 K/MM3 (ref 1–4.8)
LYMPHOCYTES NFR BLD AUTO: 12 % (ref 24–44)
MCH RBC QN AUTO: 29.4 PG (ref 28–32)
MCHC RBC AUTO-ENTMCNC: 33.8 G/DL (ref 32–36)
MCHC RBC AUTO-ENTMCNC: 87 FL (ref 83–99)
MONOCYTES # BLD AUTO: 0.8 K/MM3 (ref 0–0.8)
MONOCYTES NFR BLD AUTO: 7.1 % (ref 0–8)
NEUTROPHILS # BLD AUTO: 8.4 K/MM3 (ref 1.8–7.7)
NEUTROPHILS NFR BLD AUTO: 80.2 % (ref 41–71)
NRBC BLD AUTO-RTO: 0 % (ref 0–0.02)
NRBC BLD AUTO-RTO: 0 % (ref 0–0.2)
PLATELET # BLD AUTO: 417 K/MM3 (ref 150–400)
PMV BLD AUTO: 8.7 FL (ref 9.4–12.4)
POTASSIUM SERPL-SCNC: 3.4 MEQ/L (ref 3.5–5.1)
PROT SERPL-MCNC: 7.6 G/DL (ref 6.4–8.2)
RBC # BLD AUTO: 5.75 M/MM3 (ref 4.52–5.9)
SODIUM SERPL-SCNC: 140 MEQ/L (ref 136–145)
WBC # BLD AUTO: 10.49 K/MM3 (ref 3.9–11.3)

## 2025-02-12 PROCEDURE — 85025 COMPLETE CBC W/AUTO DIFF WBC: CPT

## 2025-02-12 PROCEDURE — 36415 COLL VENOUS BLD VENIPUNCTURE: CPT

## 2025-02-12 PROCEDURE — 99284 EMERGENCY DEPT VISIT MOD MDM: CPT

## 2025-02-12 PROCEDURE — 80053 COMPREHEN METABOLIC PANEL: CPT

## 2025-02-12 PROCEDURE — 96374 THER/PROPH/DIAG INJ IV PUSH: CPT

## 2025-02-12 PROCEDURE — 96375 TX/PRO/DX INJ NEW DRUG ADDON: CPT

## 2025-02-12 PROCEDURE — 96376 TX/PRO/DX INJ SAME DRUG ADON: CPT

## 2025-02-12 PROCEDURE — 83735 ASSAY OF MAGNESIUM: CPT

## 2025-02-12 RX ADMIN — DIPHENHYDRAMINE HYDROCHLORIDE ONE MG: 50 INJECTION, SOLUTION INTRAMUSCULAR; INTRAVENOUS at 17:08

## 2025-02-12 RX ADMIN — Medication PRN ML: at 17:08

## 2025-02-13 RX ADMIN — DIPHENHYDRAMINE HYDROCHLORIDE ONE MG: 50 INJECTION, SOLUTION INTRAMUSCULAR; INTRAVENOUS at 08:24

## 2025-02-25 ENCOUNTER — OFFICE VISIT (OUTPATIENT)
Dept: NEUROSURGERY | Facility: CLINIC | Age: 55
End: 2025-02-25
Payer: COMMERCIAL

## 2025-02-25 VITALS
HEART RATE: 94 BPM | TEMPERATURE: 98.1 F | SYSTOLIC BLOOD PRESSURE: 123 MMHG | RESPIRATION RATE: 14 BRPM | OXYGEN SATURATION: 97 % | DIASTOLIC BLOOD PRESSURE: 83 MMHG

## 2025-02-25 DIAGNOSIS — R06.6 INTRACTABLE HICCUPS: Primary | ICD-10-CM

## 2025-02-25 PROCEDURE — 3079F DIAST BP 80-89 MM HG: CPT | Performed by: NEUROLOGICAL SURGERY

## 2025-02-25 PROCEDURE — 3074F SYST BP LT 130 MM HG: CPT | Performed by: NEUROLOGICAL SURGERY

## 2025-02-25 PROCEDURE — 1125F AMNT PAIN NOTED PAIN PRSNT: CPT | Performed by: NEUROLOGICAL SURGERY

## 2025-02-25 PROCEDURE — 99024 POSTOP FOLLOW-UP VISIT: CPT | Performed by: NEUROLOGICAL SURGERY

## 2025-02-25 RX ORDER — BACLOFEN 5 MG/1
TABLET ORAL
COMMUNITY
Start: 2025-02-13

## 2025-02-25 ASSESSMENT — PATIENT HEALTH QUESTIONNAIRE - PHQ9: SUM OF ALL RESPONSES TO PHQ QUESTIONS 1-9: 12

## 2025-02-25 ASSESSMENT — PAIN SCALES - GENERAL: PAINLEVEL_OUTOF10: MODERATE PAIN (6)

## 2025-02-25 NOTE — NURSING NOTE
Chief Complaint   Patient presents with    Surgical Followup     Depression Response    Patient completed the PHQ-9 assessment for depression and scored >9? Yes  Question 9 on the PHQ-9 was positive for suicidality? No  Does patient have current mental health provider? No    Is this a virtual visit? No    I personally notified the following: visit provider    /82 (BP Location: Right arm, Patient Position: Sitting, Cuff Size: Adult Regular)   Pulse 90   Temp 98.1  F (36.7  C) (Oral)   Resp 14   SpO2 98%     RIK HARPREET

## 2025-02-25 NOTE — PATIENT INSTRUCTIONS
Follow up with Dr. Fischer in 6 weeks. Our  will reach out to you. If you don't hear back, please call.    Sincerely,  Reta Bowles, JOHN  [phone]

## 2025-02-25 NOTE — LETTER
2/25/2025       RE: Roque Yang  [address]  [place] [address]     Dear Colleague,    Thank you for referring your patient, oRque Yang, to the [facility] NEUROSURGERY [facility]INIC [place] at [facility]. Please see a copy of my visit note below.    Neurosurgery Clinic Note    Reason for Visit: postop vns    History of Present Illness  Roque Yang is a 54-year-old male with a history of intractable hiccups and spastic diplegic cerebral palsy who underwent left-sided vagus nerve stimulator (VNS) implantation on 01/29/2025 for symptom management. He presents today for his first postoperative programming and titration. He reports no significant discomfort at the surgical sites, and there are no signs of infection. During this visit, VNS output current was gradually increased, but he did not perceive any noticeable stimulation. He tolerated the adjustments well without significant side effects. Given the lack of immediate perceptible response, he was counseled on expected adaptation over time and the need for further adjustments. Plans were made for continued titration with follow-up in six weeks.       Allergies   Allergen Reactions     Aspirin Hives and Rash       Current Outpatient Medications   Medication Sig Dispense Refill     acetaminophen (TYLENOL) 325 MG tablet Take 2 tablets (650 mg) by mouth every 6 hours as needed for mild pain. 30 tablet 0     Baclofen (LIORESAL) 5 MG tablet TAKE 1 TABLET BY MOUTH EVERY 8 HOURS AS NEEDED FOR HICCUPS MUSCLE SPASMS       chlorproMAZINE (THORAZINE) 25 MG tablet Take 25 mg by mouth 3 times daily as needed.       esomeprazole (NEXIUM) 40 MG DR capsule Take 40 mg by mouth 2 times daily.       fluticasone (FLONASE) 50 MCG/ACT nasal spray Spray 2 sprays into both nostrils daily.       No current facility-administered medications for this visit.             Physical Exam  /83 (BP Location: Right arm,  Patient Position: Sitting, Cuff Size: Adult Regular)   Pulse 94   Temp 98.1  F (36.7  C) (Oral)   Resp 14   SpO2 97%       General: Awake, alert, oriented. Well nourished, well developed, no acute distress.  HEENT: Head normocephalic, atraumatic.   Incisions healing well. No erythema    Neurological  Awake, alert and oriented to date, time, place and person. Speech fluent.   Pupils equal, round, reactive to light.  Extraocular movement intact.      Assessment and Plan   Roque Yang is a 54-year-old male with intractable hiccups refractory to extensive medical management and spastic diplegic cerebral palsy, status post left-sided vagus nerve stimulator (VNS) implantation on 01/29/2025. At today s postoperative visit, he exhibits no signs of infection, hematoma, or hardware-related complications, and his incisions are healing well. Initial VNS programming and titration were performed, increasing the output current in a controlled manner. While he did not perceive immediate stimulation effects, this is not unexpected at this stage, as patients often acclimate gradually to VNS therapy over time. He is now at 1.25 mA 20Hz. There were no reported adverse effects, including voice changes, pain, or syncope. Given his history of severe functional impairment due to his hiccups, further incremental titration is planned to optimize symptom control. Follow-up is scheduled in six weeks for continued adjustments and assessment of therapeutic response.      Follow up in 6 weeks    Jason Fischer MD  Department of Neurosurgery  [facility]      Again, thank you for allowing me to participate in the care of your patient.      Sincerely,    Jason Fischer MD

## 2025-03-05 NOTE — PROGRESS NOTES
Neurosurgery Clinic Note    Reason for Visit: postop vns    History of Present Illness  Roque Yang is a 54-year-old male with a history of intractable hiccups and spastic diplegic cerebral palsy who underwent left-sided vagus nerve stimulator (VNS) implantation on 01/29/2025 for symptom management. He presents today for his first postoperative programming and titration. He reports no significant discomfort at the surgical sites, and there are no signs of infection. During this visit, VNS output current was gradually increased, but he did not perceive any noticeable stimulation. He tolerated the adjustments well without significant side effects. Given the lack of immediate perceptible response, he was counseled on expected adaptation over time and the need for further adjustments. Plans were made for continued titration with follow-up in six weeks.       Allergies   Allergen Reactions    Aspirin Hives and Rash       Current Outpatient Medications   Medication Sig Dispense Refill    acetaminophen (TYLENOL) 325 MG tablet Take 2 tablets (650 mg) by mouth every 6 hours as needed for mild pain. 30 tablet 0    Baclofen (LIORESAL) 5 MG tablet TAKE 1 TABLET BY MOUTH EVERY 8 HOURS AS NEEDED FOR HICCUPS MUSCLE SPASMS      chlorproMAZINE (THORAZINE) 25 MG tablet Take 25 mg by mouth 3 times daily as needed.      esomeprazole (NEXIUM) 40 MG DR capsule Take 40 mg by mouth 2 times daily.      fluticasone (FLONASE) 50 MCG/ACT nasal spray Spray 2 sprays into both nostrils daily.       No current facility-administered medications for this visit.             Physical Exam  /83 (BP Location: Right arm, Patient Position: Sitting, Cuff Size: Adult Regular)   Pulse 94   Temp 98.1  F (36.7  C) (Oral)   Resp 14   SpO2 97%       General: Awake, alert, oriented. Well nourished, well developed, no acute distress.  HEENT: Head normocephalic, atraumatic.   Incisions healing well. No erythema    Neurological  Awake, alert and  oriented to date, time, place and person. Speech fluent.   Pupils equal, round, reactive to light.  Extraocular movement intact.      Assessment and Plan   Roque Yang is a 54-year-old male with intractable hiccups refractory to extensive medical management and spastic diplegic cerebral palsy, status post left-sided vagus nerve stimulator (VNS) implantation on 01/29/2025. At today s postoperative visit, he exhibits no signs of infection, hematoma, or hardware-related complications, and his incisions are healing well. Initial VNS programming and titration were performed, increasing the output current in a controlled manner. While he did not perceive immediate stimulation effects, this is not unexpected at this stage, as patients often acclimate gradually to VNS therapy over time. He is now at 1.25 mA 20Hz. There were no reported adverse effects, including voice changes, pain, or syncope. Given his history of severe functional impairment due to his hiccups, further incremental titration is planned to optimize symptom control. Follow-up is scheduled in six weeks for continued adjustments and assessment of therapeutic response.      Follow up in 6 weeks    Jason Fischer MD  Department of Neurosurgery  [facility]

## 2025-04-01 ENCOUNTER — HOSPITAL ENCOUNTER (EMERGENCY)
Dept: HOSPITAL 41 - JD.ED | Age: 55
Discharge: HOME | End: 2025-04-01
Payer: MEDICARE

## 2025-04-01 DIAGNOSIS — Z79.899: ICD-10-CM

## 2025-04-01 DIAGNOSIS — Z86.16: ICD-10-CM

## 2025-04-01 DIAGNOSIS — Z88.6: ICD-10-CM

## 2025-04-01 DIAGNOSIS — H61.23: ICD-10-CM

## 2025-04-01 DIAGNOSIS — R06.6: Primary | ICD-10-CM

## 2025-04-01 LAB
ALBUMIN SERPL-MCNC: 3.8 G/DL (ref 3.4–5)
ALP SERPL-CCNC: 79 U/L (ref 46–116)
ALT SERPL-CCNC: 18 U/L (ref 16–63)
ANION GAP SERPL CALC-SCNC: 8.1 MMOL/L (ref 5–15)
APPEARANCE UR: CLEAR
AST SERPL-CCNC: 12 U/L (ref 15–37)
BASOPHILS # BLD AUTO: 0.1 K/MM3 (ref 0–0.2)
BASOPHILS NFR BLD AUTO: 0.6 % (ref 0–1)
BILIRUB SERPL-MCNC: 0.4 MG/DL (ref 0.2–1)
BILIRUB UR STRIP-MCNC: NEGATIVE MG/DL
BUN SERPL-MCNC: 11 MG/DL (ref 7–18)
CALCIUM SERPL-MCNC: 9.4 MG/DL (ref 8.5–10.1)
CHLORIDE SERPL-SCNC: 104 MEQ/L (ref 98–107)
CO2 SERPL-SCNC: 30 MEQ/L (ref 21–32)
COLOR UR: YELLOW
CRP SERPL-MCNC: 0.09 MG/DL (ref ?–0.3)
EGFRCR SERPLBLD CKD-EPI 2021: 88 ML/MIN (ref 60–?)
EOSINOPHIL NFR BLD AUTO: 0.5 % (ref 0–6)
GLOBULIN SER-MCNC: 3.7 GM/DL
GLUCOSE SERPL-MCNC: 82 MG/DL (ref 70–99)
GLUCOSE UR STRIP-MCNC: NEGATIVE MG/DL
HCT VFR BLD AUTO: 48.6 % (ref 42–52)
HGB BLD-MCNC: 16.1 GM/DL (ref 14–18)
IMM GRANULOCYTES # BLD: 0.02 K/MM3 (ref 0–0.05)
IMM GRANULOCYTES NFR BLD: 0.3 % (ref 0–0.4)
KETONES UR STRIP-MCNC: NEGATIVE MG/DL
LYMPHOCYTES # BLD AUTO: 0.9 K/MM3 (ref 1–4.8)
LYMPHOCYTES NFR BLD AUTO: 11.4 % (ref 24–44)
MAGNESIUM SERPL-MCNC: 1.9 MG/DL (ref 1.8–2.4)
MCH RBC QN AUTO: 29.1 PG (ref 28–32)
MCHC RBC AUTO-ENTMCNC: 33.1 G/DL (ref 32–36)
MCHC RBC AUTO-ENTMCNC: 87.9 FL (ref 83–99)
MONOCYTES # BLD AUTO: 0.4 K/MM3 (ref 0–0.8)
MONOCYTES NFR BLD AUTO: 5.4 % (ref 0–8)
NEUTROPHILS # BLD AUTO: 6.3 K/MM3 (ref 1.8–7.7)
NEUTROPHILS NFR BLD AUTO: 81.8 % (ref 41–71)
NITRITE UR QL: NEGATIVE
PLATELET # BLD AUTO: 349 K/MM3 (ref 150–400)
PMV BLD AUTO: 8.7 FL (ref 9.4–12.4)
POTASSIUM SERPL-SCNC: 4.1 MEQ/L (ref 3.5–5.1)
PROT SERPL-MCNC: 7.5 G/DL (ref 6.4–8.2)
PROT UR STRIP-MCNC: NEGATIVE MG/DL
RBC # BLD AUTO: 5.53 M/MM3 (ref 4.52–5.9)
RBC UR QL: NEGATIVE
SODIUM SERPL-SCNC: 138 MEQ/L (ref 136–145)
SP GR UR STRIP: 1.01 (ref 1–1.03)
TROPONIN I SERPL HS-IMP: < 4 PG/ML (ref ?–76)
UROBILINOGEN UR STRIP-ACNC: 0.2 (ref 0.2–1)
WBC # BLD AUTO: 7.71 K/MM3 (ref 3.9–11.3)

## 2025-04-01 PROCEDURE — 86140 C-REACTIVE PROTEIN: CPT

## 2025-04-01 PROCEDURE — 85025 COMPLETE CBC W/AUTO DIFF WBC: CPT

## 2025-04-01 PROCEDURE — 81003 URINALYSIS AUTO W/O SCOPE: CPT

## 2025-04-01 PROCEDURE — 85379 FIBRIN DEGRADATION QUANT: CPT

## 2025-04-01 PROCEDURE — 96374 THER/PROPH/DIAG INJ IV PUSH: CPT

## 2025-04-01 PROCEDURE — 99284 EMERGENCY DEPT VISIT MOD MDM: CPT

## 2025-04-01 PROCEDURE — 93005 ELECTROCARDIOGRAM TRACING: CPT

## 2025-04-01 PROCEDURE — 71045 X-RAY EXAM CHEST 1 VIEW: CPT

## 2025-04-01 PROCEDURE — 80053 COMPREHEN METABOLIC PANEL: CPT

## 2025-04-01 PROCEDURE — 36415 COLL VENOUS BLD VENIPUNCTURE: CPT

## 2025-04-01 PROCEDURE — 96372 THER/PROPH/DIAG INJ SC/IM: CPT

## 2025-04-01 PROCEDURE — 83735 ASSAY OF MAGNESIUM: CPT

## 2025-04-01 PROCEDURE — 84484 ASSAY OF TROPONIN QUANT: CPT

## 2025-04-01 PROCEDURE — 96361 HYDRATE IV INFUSION ADD-ON: CPT

## 2025-04-01 RX ADMIN — DIPHENHYDRAMINE HYDROCHLORIDE ONE MG: 50 INJECTION, SOLUTION INTRAMUSCULAR; INTRAVENOUS at 17:09

## 2025-04-01 RX ADMIN — NITROGLYCERIN ONE: 0.4 TABLET SUBLINGUAL at 20:06

## 2025-04-01 RX ADMIN — LORAZEPAM ONE: 2 INJECTION INTRAMUSCULAR; INTRAVENOUS at 20:05

## 2025-05-12 ENCOUNTER — TELEPHONE (OUTPATIENT)
Dept: NEUROSURGERY | Facility: CLINIC | Age: 55
End: 2025-05-12
Payer: COMMERCIAL

## 2025-05-12 NOTE — TELEPHONE ENCOUNTER
Spoke with Precious today to clarify if patient is being followed locally by a Neurologist and if they were planning to keep their appointment at the Neurosurgery clinic tomorrow. Precious informed be that the appointment with the local Neurologist isn't until September so they are planning to continue care with Dr Fischer until that time.     Patient expressed appreciation for the call and reported no additional concerns at this time.    CECILLE Yang RN, BSN  RN Coordinator Neurosurgery   Direct: [phone]  Clinic: [phone]

## 2025-05-13 ENCOUNTER — OFFICE VISIT (OUTPATIENT)
Dept: NEUROSURGERY | Facility: CLINIC | Age: 55
End: 2025-05-13
Attending: NEUROLOGICAL SURGERY
Payer: COMMERCIAL

## 2025-05-13 VITALS
OXYGEN SATURATION: 97 % | HEART RATE: 86 BPM | RESPIRATION RATE: 16 BRPM | SYSTOLIC BLOOD PRESSURE: 110 MMHG | DIASTOLIC BLOOD PRESSURE: 77 MMHG

## 2025-05-13 DIAGNOSIS — R06.6 INTRACTABLE HICCUPS: Primary | ICD-10-CM

## 2025-05-13 ASSESSMENT — PAIN SCALES - GENERAL: PAINLEVEL_OUTOF10: MODERATE PAIN (5)

## 2025-05-13 NOTE — LETTER
5/13/2025       RE: Roque Yang  [address]  [place] [address]     Dear Colleague,    Thank you for referring your patient, Roque Yang, to the [facility] NEUROSURGERY CLINIC [place] at [facility]. Please see a copy of my visit note below.    Neurosurgery Clinic Note    Reason for Visit: VNS for intractable hiccups    History of Present Illness  Roque Yang is a 54-year-old man with spastic diplegic cerebral palsy and intractable hiccups, status post left-sided vagus nerve stimulator implantation on 29 Jan 2025. He returns today for a second programming session. At the start of the visit his output current remained at 1.25 mA, and he continued to experience near-constant hiccups that markedly impair quality of life. After several attempts to establish telemetry, the device was re-interrogated and the output current was titrated to 1.75 mA (pulse width 500  s, rate 20 Hz, duty cycle unchanged). The magnet-activated boost feature set to 2.0 mA was enabled so he can swipe the magnet for an extra pulse train during flares. He and his caregiver were counselled that therapeutic benefit often emerges gradually over months and that even a 25 percent reduction in hiccup burden would guide further optimization.    He denies incisional pain, drainage, dysphagia, dyspnea, syncope, or voice changes during today s stimulation. Persistent leg and hip pain were noted; orthopedics previously advised against surgical intervention. He remains frustrated by chronic symptoms yet is optimistic about VNS.          Allergies   Allergen Reactions     Aspirin Hives and Rash       Current Outpatient Medications   Medication Sig Dispense Refill     acetaminophen (TYLENOL) 325 MG tablet Take 2 tablets (650 mg) by mouth every 6 hours as needed for mild pain. 30 tablet 0     chlorproMAZINE (THORAZINE) 25 MG tablet Take 25 mg by mouth 3 times daily as needed.        esomeprazole (NEXIUM) 40 MG DR capsule Take 40 mg by mouth 2 times daily.       fluticasone (FLONASE) 50 MCG/ACT nasal spray Spray 2 sprays into both nostrils daily as needed.       Baclofen (LIORESAL) 5 MG tablet TAKE 1 TABLET BY MOUTH EVERY 8 HOURS AS NEEDED FOR HICCUPS MUSCLE SPASMS       No current facility-administered medications for this visit.             Physical Exam  /77 (BP Location: Right arm, Patient Position: Sitting, Cuff Size: Adult Regular)   Pulse 86   Resp 16   SpO2 97%     Incisions appear well healed    General: Awake, alert, oriented. Well nourished, well developed, no acute distress.     ROS: 10 point ROS neg other than the symptoms noted above in the HPI.        Assessment and Plan   Roque Yang is a 54-year-old man with spastic diplegic cerebral palsy and intractable hiccups who is now a little more than three months removed from left-sided VNS implantation (29 Jan 2025). He remains free of surgical complications, and at today s visit his generator was interrogated and the output current advanced from 1.25 mA to 1.75 mA with the magnet-boost feature activated. He tolerated titration without voice change, syncope, or wound discomfort. I interrogated the device today and found a nominal impedance of 2900 Ohms. We had a long discussion about the dosing of VNS for other predicates diseases and discussed that there is typically an inverted U shape to optimal dosing. When this is combined with a long effect size time, this can make optimization challenging. Th result is that we tune the device into the typical therapeutic range and wait the requisite 6-9 months to observe any effects. If at that time, we have no achieved benefit, then we can tweak the dosing. He is now in the typical therapeutic window at 1.75 mA 20 Hz with a 10% duty cycle. We discussed that he could use the magnet swipe to get an extra dose at 2.0 mA and that the system would track his magnet swipes. Given  ongoing nausea and medication intolerance, we discussed adding olanzapine 5 mg at bedtime for breakthrough episodes, which they discussed ; he will review this option with his primary provider. We encouraged journaling of hiccup burden and magnet swipes to capture gradual change, reinforced realistic expectations, and offered to consult with his local neurologist ahead of his September visit. He was invited to return to my clinic for further programming but they would prefer to go someone closer.     Return to clinic as needed    Jason Fischer MD  Department of Neurosurgery  [facility]      Again, thank you for allowing me to participate in the care of your patient.      Sincerely,    Jason Fischer MD

## 2025-05-13 NOTE — NURSING NOTE
Chief Complaint   Patient presents with    RECHECK     /77 (BP Location: Right arm, Patient Position: Sitting, Cuff Size: Adult Regular)   Pulse 86   Resp 16   SpO2 97%     RIK HARPREET

## 2025-05-13 NOTE — PROGRESS NOTES
Neurosurgery Clinic Note    Reason for Visit: VNS for intractable hiccups    History of Present Illness  Roque Yang is a 54-year-old man with spastic diplegic cerebral palsy and intractable hiccups, status post left-sided vagus nerve stimulator implantation on 29 Jan 2025. He returns today for a second programming session. At the start of the visit his output current remained at 1.25 mA, and he continued to experience near-constant hiccups that markedly impair quality of life. After several attempts to establish telemetry, the device was re-interrogated and the output current was titrated to 1.75 mA (pulse width 500  s, rate 20 Hz, duty cycle unchanged). The magnet-activated boost feature set to 2.0 mA was enabled so he can swipe the magnet for an extra pulse train during flares. He and his caregiver were counselled that therapeutic benefit often emerges gradually over months and that even a 25 percent reduction in hiccup burden would guide further optimization.    He denies incisional pain, drainage, dysphagia, dyspnea, syncope, or voice changes during today s stimulation. Persistent leg and hip pain were noted; orthopedics previously advised against surgical intervention. He remains frustrated by chronic symptoms yet is optimistic about VNS.          Allergies   Allergen Reactions    Aspirin Hives and Rash       Current Outpatient Medications   Medication Sig Dispense Refill    acetaminophen (TYLENOL) 325 MG tablet Take 2 tablets (650 mg) by mouth every 6 hours as needed for mild pain. 30 tablet 0    chlorproMAZINE (THORAZINE) 25 MG tablet Take 25 mg by mouth 3 times daily as needed.      esomeprazole (NEXIUM) 40 MG DR capsule Take 40 mg by mouth 2 times daily.      fluticasone (FLONASE) 50 MCG/ACT nasal spray Spray 2 sprays into both nostrils daily as needed.      Baclofen (LIORESAL) 5 MG tablet TAKE 1 TABLET BY MOUTH EVERY 8 HOURS AS NEEDED FOR HICCUPS MUSCLE SPASMS       No current facility-administered  medications for this visit.             Physical Exam  /77 (BP Location: Right arm, Patient Position: Sitting, Cuff Size: Adult Regular)   Pulse 86   Resp 16   SpO2 97%     Incisions appear well healed    General: Awake, alert, oriented. Well nourished, well developed, no acute distress.     ROS: 10 point ROS neg other than the symptoms noted above in the HPI.        Assessment and Plan   Roque Yang is a 54-year-old man with spastic diplegic cerebral palsy and intractable hiccups who is now a little more than three months removed from left-sided VNS implantation (29 Jan 2025). He remains free of surgical complications, and at today s visit his generator was interrogated and the output current advanced from 1.25 mA to 1.75 mA with the magnet-boost feature activated. He tolerated titration without voice change, syncope, or wound discomfort. I interrogated the device today and found a nominal impedance of 2900 Ohms. We had a long discussion about the dosing of VNS for other predicates diseases and discussed that there is typically an inverted U shape to optimal dosing. When this is combined with a long effect size time, this can make optimization challenging. Th result is that we tune the device into the typical therapeutic range and wait the requisite 6-9 months to observe any effects. If at that time, we have no achieved benefit, then we can tweak the dosing. He is now in the typical therapeutic window at 1.75 mA 20 Hz with a 10% duty cycle. We discussed that he could use the magnet swipe to get an extra dose at 2.0 mA and that the system would track his magnet swipes. Given ongoing nausea and medication intolerance, we discussed adding olanzapine 5 mg at bedtime for breakthrough episodes, which they discussed ; he will review this option with his primary provider. We encouraged journaling of hiccup burden and magnet swipes to capture gradual change, reinforced realistic expectations, and offered to  consult with his local neurologist ahead of his September visit. He was invited to return to my clinic for further programming but they would prefer to go someone closer.     Return to clinic as needed    Jsaon Fischer MD  Department of Neurosurgery  [facility]

## 2025-06-07 ENCOUNTER — HOSPITAL ENCOUNTER (EMERGENCY)
Dept: HOSPITAL 41 - JD.ED | Age: 55
Discharge: HOME | End: 2025-06-07
Payer: MEDICARE

## 2025-06-07 DIAGNOSIS — Z86.16: ICD-10-CM

## 2025-06-07 DIAGNOSIS — Z88.6: ICD-10-CM

## 2025-06-07 DIAGNOSIS — Z79.899: ICD-10-CM

## 2025-06-07 DIAGNOSIS — G80.1: Primary | ICD-10-CM

## 2025-06-07 DIAGNOSIS — R06.6: ICD-10-CM

## 2025-06-07 LAB
ALBUMIN SERPL-MCNC: 4.1 G/DL (ref 3.4–5)
ALBUMIN/GLOB SERPL: 1.2 {RATIO} (ref 1–2)
ALP SERPL-CCNC: 85 U/L (ref 46–116)
ALT SERPL-CCNC: 19 U/L (ref 16–63)
ANION GAP SERPL CALC-SCNC: 15.2 MMOL/L (ref 5–15)
AST SERPL-CCNC: 13 U/L (ref 15–37)
BASOPHILS # BLD AUTO: 0.1 K/MM3 (ref 0–0.2)
BASOPHILS NFR BLD AUTO: 0.6 % (ref 0–1)
BILIRUB SERPL-MCNC: 0.6 MG/DL (ref 0.2–1)
BUN SERPL-MCNC: 9 MG/DL (ref 7–18)
BUN/CREAT SERPL: 6.9 (ref 14–18)
CALCIUM SERPL-MCNC: 9.1 MG/DL (ref 8.5–10.1)
CHLORIDE SERPL-SCNC: 100 MEQ/L (ref 98–107)
CK SERPL-CCNC: 46 U/L (ref 39–308)
CO2 SERPL-SCNC: 26 MEQ/L (ref 21–32)
CREAT CL 24H UR+SERPL-VRATE: 42.21 ML/MIN
CREAT SERPL-MCNC: 1.3 MG/DL (ref 0.7–1.3)
EGFRCR SERPLBLD CKD-EPI 2021: 64 ML/MIN (ref 60–?)
EOSINOPHIL # BLD AUTO: 0 K/MM3 (ref 0–0.4)
EOSINOPHIL NFR BLD AUTO: 0.4 % (ref 0–6)
GLOBULIN SER-MCNC: 3.5 GM/DL
GLUCOSE SERPL-MCNC: 141 MG/DL (ref 70–99)
HCT VFR BLD AUTO: 49.6 % (ref 42–52)
HGB BLD-MCNC: 16.8 GM/DL (ref 14–18)
IMM GRANULOCYTES # BLD: 0.02 K/MM3 (ref 0–0.05)
IMM GRANULOCYTES NFR BLD: 0.3 % (ref 0–0.4)
LYMPHOCYTES # BLD AUTO: 1.1 K/MM3 (ref 1–4.8)
LYMPHOCYTES NFR BLD AUTO: 14.1 % (ref 24–44)
MAGNESIUM SERPL-MCNC: 1.9 MG/DL (ref 1.8–2.4)
MCH RBC QN AUTO: 28.8 PG (ref 28–32)
MCHC RBC AUTO-ENTMCNC: 33.9 G/DL (ref 32–36)
MCHC RBC AUTO-ENTMCNC: 85.1 FL (ref 83–99)
MONOCYTES # BLD AUTO: 0.5 K/MM3 (ref 0–0.8)
MONOCYTES NFR BLD AUTO: 6.3 % (ref 0–8)
NEUTROPHILS # BLD AUTO: 6.3 K/MM3 (ref 1.8–7.7)
NEUTROPHILS NFR BLD AUTO: 78.3 % (ref 41–71)
NRBC BLD AUTO-RTO: 0 % (ref 0–0.02)
NRBC BLD AUTO-RTO: 0 % (ref 0–0.2)
PATH REV BLD -IMP: (no result)
PLATELET # BLD AUTO: 380 K/MM3 (ref 150–400)
PMV BLD AUTO: 8.4 FL (ref 9.4–12.4)
POTASSIUM SERPL-SCNC: 4.2 MEQ/L (ref 3.5–5.1)
PROT SERPL-MCNC: 7.6 G/DL (ref 6.4–8.2)
RBC # BLD AUTO: 5.83 M/MM3 (ref 4.52–5.9)
SODIUM SERPL-SCNC: 137 MEQ/L (ref 136–145)
TROPONIN I SERPL HS-IMP: < 4 PG/ML (ref ?–76)
WBC # BLD AUTO: 8 K/MM3 (ref 3.9–11.3)

## 2025-06-07 PROCEDURE — 84484 ASSAY OF TROPONIN QUANT: CPT

## 2025-06-07 PROCEDURE — 80053 COMPREHEN METABOLIC PANEL: CPT

## 2025-06-07 PROCEDURE — 96374 THER/PROPH/DIAG INJ IV PUSH: CPT

## 2025-06-07 PROCEDURE — 93005 ELECTROCARDIOGRAM TRACING: CPT

## 2025-06-07 PROCEDURE — 99284 EMERGENCY DEPT VISIT MOD MDM: CPT

## 2025-06-07 PROCEDURE — 83735 ASSAY OF MAGNESIUM: CPT

## 2025-06-07 PROCEDURE — 36415 COLL VENOUS BLD VENIPUNCTURE: CPT

## 2025-06-07 PROCEDURE — 82550 ASSAY OF CK (CPK): CPT

## 2025-06-07 PROCEDURE — 96375 TX/PRO/DX INJ NEW DRUG ADDON: CPT

## 2025-06-07 PROCEDURE — 85025 COMPLETE CBC W/AUTO DIFF WBC: CPT

## 2025-06-07 RX ADMIN — LORAZEPAM ONE MG: 2 INJECTION INTRAMUSCULAR; INTRAVENOUS at 14:14

## 2025-06-07 RX ADMIN — SODIUM CHLORIDE SCH MLS/HR: 0.9 INJECTION, SOLUTION INTRAVENOUS at 14:14

## 2025-06-07 RX ADMIN — CHLORPROMAZINE HYDROCHLORIDE STA MG: 25 INJECTION INTRAMUSCULAR at 14:14

## 2025-07-01 ENCOUNTER — MYC MEDICAL ADVICE (OUTPATIENT)
Dept: NEUROSURGERY | Facility: CLINIC | Age: 55
End: 2025-07-01
Payer: COMMERCIAL

## 2025-07-01 DIAGNOSIS — R06.6 INTRACTABLE HICCUPS: Primary | ICD-10-CM

## 2025-07-25 PROBLEM — R06.6 INTRACTABLE HICCUPS: Status: ACTIVE | Noted: 2018-10-09

## 2025-08-12 ENCOUNTER — ANCILLARY PROCEDURE (OUTPATIENT)
Dept: RADIOLOGY | Facility: AMBULATORY SURGERY CENTER | Age: 55
End: 2025-08-12
Attending: ANESTHESIOLOGY
Payer: COMMERCIAL

## 2025-08-12 ENCOUNTER — HOSPITAL ENCOUNTER (OUTPATIENT)
Facility: AMBULATORY SURGERY CENTER | Age: 55
Discharge: HOME | End: 2025-08-12
Attending: ANESTHESIOLOGY
Payer: COMMERCIAL

## 2025-08-12 VITALS
RESPIRATION RATE: 16 BRPM | OXYGEN SATURATION: 100 % | DIASTOLIC BLOOD PRESSURE: 86 MMHG | SYSTOLIC BLOOD PRESSURE: 132 MMHG

## 2025-08-12 DIAGNOSIS — R52 PAIN: ICD-10-CM

## 2025-08-12 RX ORDER — DIAZEPAM 5 MG/1
5-10 TABLET ORAL
Status: COMPLETED | OUTPATIENT
Start: 2025-08-12 | End: 2025-08-12

## 2025-08-12 RX ORDER — LIDOCAINE HYDROCHLORIDE 10 MG/ML
INJECTION, SOLUTION EPIDURAL; INFILTRATION; INTRACAUDAL; PERINEURAL PRN
Status: DISCONTINUED | OUTPATIENT
Start: 2025-08-12 | End: 2025-08-12 | Stop reason: HOSPADM

## 2025-08-12 RX ORDER — BUPIVACAINE HYDROCHLORIDE 2.5 MG/ML
INJECTION, SOLUTION EPIDURAL; INFILTRATION; INTRACAUDAL; PERINEURAL PRN
Status: DISCONTINUED | OUTPATIENT
Start: 2025-08-12 | End: 2025-08-12 | Stop reason: HOSPADM

## 2025-08-12 RX ADMIN — DIAZEPAM 5 MG: 5 TABLET ORAL at 09:46

## (undated) DEVICE — SU ETHIBOND 3-0 RB-1DA 36" X558H

## (undated) DEVICE — VIAL DECANTER STERILE WHITE DYNJDEC06

## (undated) DEVICE — COVER CAMERA VIDEO LASER 9X96" 04-CC227

## (undated) DEVICE — SU MONOCRYL 4-0 PS-2 27" UND Y426H

## (undated) DEVICE — SU VICRYL 3-0 SH 8X18" UND J864D

## (undated) DEVICE — PREP CHLORAPREP CLEAR 3ML 930400

## (undated) DEVICE — VESSEL LOOPS YELLOW MAXI 31145694

## (undated) DEVICE — SUTURE BOOTS 051003PBX

## (undated) DEVICE — PREP CHLORAPREP W/ORANGE TINT 10.5ML 260715

## (undated) DEVICE — RX SURGIFLO HEMOSTATIC MATRIX W/THROMBIN 8ML 2994

## (undated) DEVICE — RETR ELASTIC STAYS LONE STAR BLUNT DUAL LEAD 3550-1G

## (undated) DEVICE — GLOVE PROTEXIS MICRO 7.0 LT BLUE 2D73PM70

## (undated) DEVICE — TRAY PAIN INJECTION 97A 640

## (undated) DEVICE — SPONGE SURGIFOAM 12 1972

## (undated) DEVICE — SHUNT TUNNELER SHEATH 38CM 48407

## (undated) DEVICE — SU ETHIBOND 2-0 SHDA 30" X563H

## (undated) DEVICE — SU DERMABOND ADVANCED .7ML DNX12

## (undated) DEVICE — GOWN LG DISP 9515

## (undated) DEVICE — Device

## (undated) DEVICE — SPONGE COTTONOID 1/2X1/2" 20-04S

## (undated) RX ORDER — PROPOFOL 10 MG/ML
INJECTION, EMULSION INTRAVENOUS
Status: DISPENSED
Start: 2025-01-29

## (undated) RX ORDER — CEFAZOLIN SODIUM/WATER 2 G/20 ML
SYRINGE (ML) INTRAVENOUS
Status: DISPENSED
Start: 2025-01-29

## (undated) RX ORDER — FENTANYL CITRATE 50 UG/ML
INJECTION, SOLUTION INTRAMUSCULAR; INTRAVENOUS
Status: DISPENSED
Start: 2025-01-29

## (undated) RX ORDER — ACETAMINOPHEN 325 MG/1
TABLET ORAL
Status: DISPENSED
Start: 2025-01-29

## (undated) RX ORDER — ONDANSETRON 2 MG/ML
INJECTION INTRAMUSCULAR; INTRAVENOUS
Status: DISPENSED
Start: 2025-01-29

## (undated) RX ORDER — DEXAMETHASONE SODIUM PHOSPHATE 4 MG/ML
INJECTION, SOLUTION INTRA-ARTICULAR; INTRALESIONAL; INTRAMUSCULAR; INTRAVENOUS; SOFT TISSUE
Status: DISPENSED
Start: 2025-01-29

## (undated) RX ORDER — GLYCOPYRROLATE 0.2 MG/ML
INJECTION, SOLUTION INTRAMUSCULAR; INTRAVENOUS
Status: DISPENSED
Start: 2025-01-29

## (undated) RX ORDER — HYDROMORPHONE HYDROCHLORIDE 1 MG/ML
INJECTION, SOLUTION INTRAMUSCULAR; INTRAVENOUS; SUBCUTANEOUS
Status: DISPENSED
Start: 2025-01-29

## (undated) RX ORDER — BUPIVACAINE HYDROCHLORIDE 2.5 MG/ML
INJECTION, SOLUTION EPIDURAL; INFILTRATION; INTRACAUDAL
Status: DISPENSED
Start: 2025-01-29

## (undated) RX ORDER — LIDOCAINE HYDROCHLORIDE AND EPINEPHRINE 10; 10 MG/ML; UG/ML
INJECTION, SOLUTION INFILTRATION; PERINEURAL
Status: DISPENSED
Start: 2025-01-29

## (undated) RX ORDER — DIAZEPAM 5 MG/1
TABLET ORAL
Status: DISPENSED
Start: 2025-08-12